# Patient Record
Sex: FEMALE | Race: WHITE | Employment: OTHER | URBAN - METROPOLITAN AREA
[De-identification: names, ages, dates, MRNs, and addresses within clinical notes are randomized per-mention and may not be internally consistent; named-entity substitution may affect disease eponyms.]

---

## 2018-09-24 ENCOUNTER — HOSPITAL ENCOUNTER (INPATIENT)
Facility: CLINIC | Age: 63
LOS: 5 days | Discharge: HOME | DRG: 193 | End: 2018-09-29
Attending: EMERGENCY MEDICINE | Admitting: HOSPITALIST
Payer: COMMERCIAL

## 2018-09-24 ENCOUNTER — APPOINTMENT (OUTPATIENT)
Dept: CT IMAGING | Facility: CLINIC | Age: 63
DRG: 193 | End: 2018-09-24
Attending: EMERGENCY MEDICINE
Payer: COMMERCIAL

## 2018-09-24 ENCOUNTER — APPOINTMENT (OUTPATIENT)
Dept: GENERAL RADIOLOGY | Facility: CLINIC | Age: 63
DRG: 193 | End: 2018-09-24
Attending: EMERGENCY MEDICINE
Payer: COMMERCIAL

## 2018-09-24 DIAGNOSIS — F43.21 ADJUSTMENT DISORDER WITH DEPRESSED MOOD: Primary | ICD-10-CM

## 2018-09-24 DIAGNOSIS — R79.89 ELEVATED D-DIMER: ICD-10-CM

## 2018-09-24 DIAGNOSIS — J18.9 PNEUMONIA OF BOTH LUNGS DUE TO INFECTIOUS ORGANISM, UNSPECIFIED PART OF LUNG: ICD-10-CM

## 2018-09-24 DIAGNOSIS — J96.01 ACUTE RESPIRATORY FAILURE WITH HYPOXIA (H): ICD-10-CM

## 2018-09-24 LAB
ALBUMIN SERPL-MCNC: 3.4 G/DL (ref 3.4–5)
ALP SERPL-CCNC: 101 U/L (ref 40–150)
ALT SERPL W P-5'-P-CCNC: 29 U/L (ref 0–50)
ANION GAP SERPL CALCULATED.3IONS-SCNC: 11 MMOL/L (ref 3–14)
AST SERPL W P-5'-P-CCNC: 18 U/L (ref 0–45)
BILIRUB SERPL-MCNC: 1.6 MG/DL (ref 0.2–1.3)
BUN SERPL-MCNC: 10 MG/DL (ref 7–30)
CALCIUM SERPL-MCNC: 8.7 MG/DL (ref 8.5–10.1)
CHLORIDE SERPL-SCNC: 103 MMOL/L (ref 94–109)
CO2 SERPL-SCNC: 24 MMOL/L (ref 20–32)
CREAT SERPL-MCNC: 0.65 MG/DL (ref 0.52–1.04)
D DIMER PPP FEU-MCNC: 2.2 UG/ML FEU (ref 0–0.5)
ERYTHROCYTE [DISTWIDTH] IN BLOOD BY AUTOMATED COUNT: 14.2 % (ref 10–15)
GFR SERPL CREATININE-BSD FRML MDRD: >90 ML/MIN/1.7M2
GLUCOSE SERPL-MCNC: 188 MG/DL (ref 70–99)
HCT VFR BLD AUTO: 35 % (ref 35–47)
HGB BLD-MCNC: 11.8 G/DL (ref 11.7–15.7)
INTERPRETATION ECG - MUSE: NORMAL
L PNEUMO1 AG UR QL IA: NORMAL
LACTATE BLD-SCNC: 1.9 MMOL/L (ref 0.7–2)
MCH RBC QN AUTO: 31.1 PG (ref 26.5–33)
MCHC RBC AUTO-ENTMCNC: 33.7 G/DL (ref 31.5–36.5)
MCV RBC AUTO: 92 FL (ref 78–100)
NT-PROBNP SERPL-MCNC: 314 PG/ML (ref 0–900)
PLATELET # BLD AUTO: 232 10E9/L (ref 150–450)
POTASSIUM SERPL-SCNC: 3 MMOL/L (ref 3.4–5.3)
PROCALCITONIN SERPL-MCNC: 0.33 NG/ML
PROT SERPL-MCNC: 7.9 G/DL (ref 6.8–8.8)
RBC # BLD AUTO: 3.8 10E12/L (ref 3.8–5.2)
SODIUM SERPL-SCNC: 138 MMOL/L (ref 133–144)
SPECIMEN SOURCE: NORMAL
TROPONIN I SERPL-MCNC: <0.015 UG/L (ref 0–0.04)
WBC # BLD AUTO: 10.3 10E9/L (ref 4–11)

## 2018-09-24 PROCEDURE — 71260 CT THORAX DX C+: CPT

## 2018-09-24 PROCEDURE — 99223 1ST HOSP IP/OBS HIGH 75: CPT | Mod: AI | Performed by: HOSPITALIST

## 2018-09-24 PROCEDURE — 80053 COMPREHEN METABOLIC PANEL: CPT | Performed by: EMERGENCY MEDICINE

## 2018-09-24 PROCEDURE — 84484 ASSAY OF TROPONIN QUANT: CPT | Performed by: EMERGENCY MEDICINE

## 2018-09-24 PROCEDURE — 84439 ASSAY OF FREE THYROXINE: CPT | Performed by: EMERGENCY MEDICINE

## 2018-09-24 PROCEDURE — 36415 COLL VENOUS BLD VENIPUNCTURE: CPT

## 2018-09-24 PROCEDURE — 84443 ASSAY THYROID STIM HORMONE: CPT | Performed by: EMERGENCY MEDICINE

## 2018-09-24 PROCEDURE — 87899 AGENT NOS ASSAY W/OPTIC: CPT | Performed by: EMERGENCY MEDICINE

## 2018-09-24 PROCEDURE — 27210339 ZZH CIRCUIT HUMIDITY W/CPAP BIP

## 2018-09-24 PROCEDURE — 96375 TX/PRO/DX INJ NEW DRUG ADDON: CPT

## 2018-09-24 PROCEDURE — 99285 EMERGENCY DEPT VISIT HI MDM: CPT | Mod: 25

## 2018-09-24 PROCEDURE — 27210338 ZZH CIRCUIT HUMID FACE/TRACH MSK

## 2018-09-24 PROCEDURE — 25000128 H RX IP 250 OP 636: Performed by: HOSPITALIST

## 2018-09-24 PROCEDURE — 25000128 H RX IP 250 OP 636: Performed by: EMERGENCY MEDICINE

## 2018-09-24 PROCEDURE — 85379 FIBRIN DEGRADATION QUANT: CPT | Performed by: EMERGENCY MEDICINE

## 2018-09-24 PROCEDURE — 96361 HYDRATE IV INFUSION ADD-ON: CPT

## 2018-09-24 PROCEDURE — 83036 HEMOGLOBIN GLYCOSYLATED A1C: CPT | Performed by: EMERGENCY MEDICINE

## 2018-09-24 PROCEDURE — 87040 BLOOD CULTURE FOR BACTERIA: CPT | Performed by: EMERGENCY MEDICINE

## 2018-09-24 PROCEDURE — 96365 THER/PROPH/DIAG IV INF INIT: CPT | Mod: 59

## 2018-09-24 PROCEDURE — 94660 CPAP INITIATION&MGMT: CPT

## 2018-09-24 PROCEDURE — 40000275 ZZH STATISTIC RCP TIME EA 10 MIN

## 2018-09-24 PROCEDURE — 25000125 ZZHC RX 250: Performed by: EMERGENCY MEDICINE

## 2018-09-24 PROCEDURE — 71046 X-RAY EXAM CHEST 2 VIEWS: CPT

## 2018-09-24 PROCEDURE — 84145 PROCALCITONIN (PCT): CPT | Performed by: EMERGENCY MEDICINE

## 2018-09-24 PROCEDURE — 93005 ELECTROCARDIOGRAM TRACING: CPT

## 2018-09-24 PROCEDURE — 25000128 H RX IP 250 OP 636

## 2018-09-24 PROCEDURE — 83880 ASSAY OF NATRIURETIC PEPTIDE: CPT | Performed by: EMERGENCY MEDICINE

## 2018-09-24 PROCEDURE — 83605 ASSAY OF LACTIC ACID: CPT | Performed by: EMERGENCY MEDICINE

## 2018-09-24 PROCEDURE — 85027 COMPLETE CBC AUTOMATED: CPT | Performed by: EMERGENCY MEDICINE

## 2018-09-24 PROCEDURE — 96367 TX/PROPH/DG ADDL SEQ IV INF: CPT | Mod: 59

## 2018-09-24 PROCEDURE — 12000007 ZZH R&B INTERMEDIATE

## 2018-09-24 PROCEDURE — 94640 AIRWAY INHALATION TREATMENT: CPT

## 2018-09-24 PROCEDURE — 00000146 ZZHCL STATISTIC GLUCOSE BY METER IP

## 2018-09-24 PROCEDURE — 25000132 ZZH RX MED GY IP 250 OP 250 PS 637: Performed by: EMERGENCY MEDICINE

## 2018-09-24 RX ORDER — POTASSIUM CHLORIDE 1.5 G/1.58G
40 POWDER, FOR SOLUTION ORAL ONCE
Status: COMPLETED | OUTPATIENT
Start: 2018-09-24 | End: 2018-09-24

## 2018-09-24 RX ORDER — AMOXICILLIN 250 MG
2 CAPSULE ORAL 2 TIMES DAILY PRN
Status: DISCONTINUED | OUTPATIENT
Start: 2018-09-24 | End: 2018-09-29 | Stop reason: HOSPADM

## 2018-09-24 RX ORDER — POTASSIUM CHLORIDE 1500 MG/1
20-40 TABLET, EXTENDED RELEASE ORAL
Status: DISCONTINUED | OUTPATIENT
Start: 2018-09-24 | End: 2018-09-29 | Stop reason: HOSPADM

## 2018-09-24 RX ORDER — GUAIFENESIN/DEXTROMETHORPHAN 100-10MG/5
5 SYRUP ORAL EVERY 4 HOURS PRN
Status: DISCONTINUED | OUTPATIENT
Start: 2018-09-24 | End: 2018-09-29 | Stop reason: HOSPADM

## 2018-09-24 RX ORDER — ALBUTEROL SULFATE 0.83 MG/ML
5 SOLUTION RESPIRATORY (INHALATION) ONCE
Status: COMPLETED | OUTPATIENT
Start: 2018-09-24 | End: 2018-09-24

## 2018-09-24 RX ORDER — DEXTROSE MONOHYDRATE 25 G/50ML
25-50 INJECTION, SOLUTION INTRAVENOUS
Status: DISCONTINUED | OUTPATIENT
Start: 2018-09-24 | End: 2018-09-29 | Stop reason: HOSPADM

## 2018-09-24 RX ORDER — POTASSIUM CHLORIDE 29.8 MG/ML
20 INJECTION INTRAVENOUS
Status: DISCONTINUED | OUTPATIENT
Start: 2018-09-24 | End: 2018-09-29 | Stop reason: HOSPADM

## 2018-09-24 RX ORDER — LIDOCAINE 40 MG/G
CREAM TOPICAL
Status: DISCONTINUED | OUTPATIENT
Start: 2018-09-24 | End: 2018-09-24

## 2018-09-24 RX ORDER — LORAZEPAM 2 MG/ML
.5-1 INJECTION INTRAMUSCULAR EVERY 4 HOURS PRN
Status: DISCONTINUED | OUTPATIENT
Start: 2018-09-24 | End: 2018-09-29 | Stop reason: HOSPADM

## 2018-09-24 RX ORDER — AMOXICILLIN 250 MG
1 CAPSULE ORAL 2 TIMES DAILY PRN
Status: DISCONTINUED | OUTPATIENT
Start: 2018-09-24 | End: 2018-09-29 | Stop reason: HOSPADM

## 2018-09-24 RX ORDER — ONDANSETRON 2 MG/ML
4 INJECTION INTRAMUSCULAR; INTRAVENOUS EVERY 6 HOURS PRN
Status: DISCONTINUED | OUTPATIENT
Start: 2018-09-24 | End: 2018-09-29 | Stop reason: HOSPADM

## 2018-09-24 RX ORDER — IPRATROPIUM BROMIDE AND ALBUTEROL SULFATE 2.5; .5 MG/3ML; MG/3ML
3 SOLUTION RESPIRATORY (INHALATION) EVERY 4 HOURS PRN
Status: DISCONTINUED | OUTPATIENT
Start: 2018-09-24 | End: 2018-09-24

## 2018-09-24 RX ORDER — POTASSIUM CHLORIDE 1.5 G/1.58G
20-40 POWDER, FOR SOLUTION ORAL
Status: DISCONTINUED | OUTPATIENT
Start: 2018-09-24 | End: 2018-09-29 | Stop reason: HOSPADM

## 2018-09-24 RX ORDER — METHYLPREDNISOLONE SODIUM SUCCINATE 125 MG/2ML
125 INJECTION, POWDER, LYOPHILIZED, FOR SOLUTION INTRAMUSCULAR; INTRAVENOUS ONCE
Status: COMPLETED | OUTPATIENT
Start: 2018-09-24 | End: 2018-09-24

## 2018-09-24 RX ORDER — ACETAMINOPHEN 325 MG/1
650 TABLET ORAL EVERY 4 HOURS PRN
Status: DISCONTINUED | OUTPATIENT
Start: 2018-09-24 | End: 2018-09-29 | Stop reason: HOSPADM

## 2018-09-24 RX ORDER — NALOXONE HYDROCHLORIDE 0.4 MG/ML
.1-.4 INJECTION, SOLUTION INTRAMUSCULAR; INTRAVENOUS; SUBCUTANEOUS
Status: DISCONTINUED | OUTPATIENT
Start: 2018-09-24 | End: 2018-09-29 | Stop reason: HOSPADM

## 2018-09-24 RX ORDER — CEFTRIAXONE 2 G/1
2 INJECTION, POWDER, FOR SOLUTION INTRAMUSCULAR; INTRAVENOUS ONCE
Status: COMPLETED | OUTPATIENT
Start: 2018-09-24 | End: 2018-09-24

## 2018-09-24 RX ORDER — DIPHENHYDRAMINE HYDROCHLORIDE 50 MG/ML
50 INJECTION INTRAMUSCULAR; INTRAVENOUS ONCE
Status: COMPLETED | OUTPATIENT
Start: 2018-09-24 | End: 2018-09-24

## 2018-09-24 RX ORDER — POTASSIUM CL/LIDO/0.9 % NACL 10MEQ/0.1L
10 INTRAVENOUS SOLUTION, PIGGYBACK (ML) INTRAVENOUS
Status: DISCONTINUED | OUTPATIENT
Start: 2018-09-24 | End: 2018-09-29 | Stop reason: HOSPADM

## 2018-09-24 RX ORDER — NICOTINE POLACRILEX 4 MG
15-30 LOZENGE BUCCAL
Status: DISCONTINUED | OUTPATIENT
Start: 2018-09-24 | End: 2018-09-29 | Stop reason: HOSPADM

## 2018-09-24 RX ORDER — POTASSIUM CHLORIDE 7.45 MG/ML
10 INJECTION INTRAVENOUS
Status: DISCONTINUED | OUTPATIENT
Start: 2018-09-24 | End: 2018-09-29 | Stop reason: HOSPADM

## 2018-09-24 RX ORDER — ACETAMINOPHEN 650 MG/1
650 SUPPOSITORY RECTAL EVERY 4 HOURS PRN
Status: DISCONTINUED | OUTPATIENT
Start: 2018-09-24 | End: 2018-09-29 | Stop reason: HOSPADM

## 2018-09-24 RX ORDER — LEVALBUTEROL 1.25 MG/.5ML
1.25 SOLUTION, CONCENTRATE RESPIRATORY (INHALATION) ONCE
Status: COMPLETED | OUTPATIENT
Start: 2018-09-24 | End: 2018-09-24

## 2018-09-24 RX ORDER — BISACODYL 10 MG
10 SUPPOSITORY, RECTAL RECTAL DAILY PRN
Status: DISCONTINUED | OUTPATIENT
Start: 2018-09-24 | End: 2018-09-29 | Stop reason: HOSPADM

## 2018-09-24 RX ORDER — IOPAMIDOL 755 MG/ML
77 INJECTION, SOLUTION INTRAVASCULAR ONCE
Status: COMPLETED | OUTPATIENT
Start: 2018-09-24 | End: 2018-09-24

## 2018-09-24 RX ORDER — SODIUM CHLORIDE 9 MG/ML
INJECTION, SOLUTION INTRAVENOUS CONTINUOUS
Status: DISCONTINUED | OUTPATIENT
Start: 2018-09-24 | End: 2018-09-25

## 2018-09-24 RX ORDER — DIPHENHYDRAMINE HYDROCHLORIDE 50 MG/ML
INJECTION INTRAMUSCULAR; INTRAVENOUS
Status: COMPLETED
Start: 2018-09-24 | End: 2018-09-24

## 2018-09-24 RX ORDER — MAGNESIUM SULFATE HEPTAHYDRATE 40 MG/ML
4 INJECTION, SOLUTION INTRAVENOUS EVERY 4 HOURS PRN
Status: DISCONTINUED | OUTPATIENT
Start: 2018-09-24 | End: 2018-09-29 | Stop reason: HOSPADM

## 2018-09-24 RX ORDER — ONDANSETRON 4 MG/1
4 TABLET, ORALLY DISINTEGRATING ORAL EVERY 6 HOURS PRN
Status: DISCONTINUED | OUTPATIENT
Start: 2018-09-24 | End: 2018-09-29 | Stop reason: HOSPADM

## 2018-09-24 RX ORDER — LEVOFLOXACIN 5 MG/ML
500 INJECTION, SOLUTION INTRAVENOUS EVERY 24 HOURS
Status: DISCONTINUED | OUTPATIENT
Start: 2018-09-24 | End: 2018-09-25

## 2018-09-24 RX ORDER — IPRATROPIUM BROMIDE AND ALBUTEROL SULFATE 2.5; .5 MG/3ML; MG/3ML
3 SOLUTION RESPIRATORY (INHALATION) EVERY 4 HOURS PRN
Status: DISCONTINUED | OUTPATIENT
Start: 2018-09-24 | End: 2018-09-27

## 2018-09-24 RX ORDER — POLYETHYLENE GLYCOL 3350 17 G/17G
17 POWDER, FOR SOLUTION ORAL DAILY PRN
Status: DISCONTINUED | OUTPATIENT
Start: 2018-09-24 | End: 2018-09-29 | Stop reason: HOSPADM

## 2018-09-24 RX ORDER — LIDOCAINE 40 MG/G
CREAM TOPICAL
Status: DISCONTINUED | OUTPATIENT
Start: 2018-09-24 | End: 2018-09-29 | Stop reason: HOSPADM

## 2018-09-24 RX ADMIN — SODIUM CHLORIDE 2000 ML: 9 INJECTION, SOLUTION INTRAVENOUS at 19:06

## 2018-09-24 RX ADMIN — SODIUM CHLORIDE: 9 INJECTION, SOLUTION INTRAVENOUS at 23:12

## 2018-09-24 RX ADMIN — IOPAMIDOL 77 ML: 755 INJECTION, SOLUTION INTRAVENOUS at 20:59

## 2018-09-24 RX ADMIN — LEVALBUTEROL HYDROCHLORIDE 1.25 MG: 1.25 SOLUTION, CONCENTRATE RESPIRATORY (INHALATION) at 21:42

## 2018-09-24 RX ADMIN — DIPHENHYDRAMINE HYDROCHLORIDE 50 MG: 50 INJECTION, SOLUTION INTRAMUSCULAR; INTRAVENOUS at 21:22

## 2018-09-24 RX ADMIN — CEFTRIAXONE SODIUM 2 G: 2 INJECTION, POWDER, FOR SOLUTION INTRAMUSCULAR; INTRAVENOUS at 19:50

## 2018-09-24 RX ADMIN — ALBUTEROL SULFATE 5 MG: 2.5 SOLUTION RESPIRATORY (INHALATION) at 19:46

## 2018-09-24 RX ADMIN — AZITHROMYCIN MONOHYDRATE 500 MG: 500 INJECTION, POWDER, LYOPHILIZED, FOR SOLUTION INTRAVENOUS at 21:05

## 2018-09-24 RX ADMIN — SODIUM CHLORIDE, PRESERVATIVE FREE 100 ML: 5 INJECTION INTRAVENOUS at 20:59

## 2018-09-24 RX ADMIN — POTASSIUM CHLORIDE 40 MEQ: 1.5 POWDER, FOR SOLUTION ORAL at 20:10

## 2018-09-24 RX ADMIN — METHYLPREDNISOLONE SODIUM SUCCINATE 125 MG: 125 INJECTION, POWDER, FOR SOLUTION INTRAMUSCULAR; INTRAVENOUS at 21:26

## 2018-09-24 RX ADMIN — DIPHENHYDRAMINE HYDROCHLORIDE 50 MG: 50 INJECTION INTRAMUSCULAR; INTRAVENOUS at 21:22

## 2018-09-24 ASSESSMENT — ENCOUNTER SYMPTOMS
CHILLS: 1
SINUS PRESSURE: 0
COUGH: 1
FEVER: 1
HEADACHES: 0
SINUS PAIN: 0
SHORTNESS OF BREATH: 1

## 2018-09-24 NOTE — ED NOTES
Bed: ED11  Expected date:   Expected time:   Means of arrival:   Comments:  Rodríguez Gaviria SOB 63 femlae

## 2018-09-24 NOTE — IP AVS SNAPSHOT
MRN:[id number]                      After Visit Summary   9/24/2018    Alaina Fernandez    MRN: [id number]           Thank you!     Thank you for choosing [facility] for your care. Our goal is always to provide you with excellent care. Hearing back from our patients is one way we can continue to improve our services. Please take a few minutes to complete the written survey that you may receive in the mail after you visit with us. Thank you!        Patient Information     Date Of Birth          1955        Designated Caregiver       Most Recent Value    Caregiver    Will someone help with your care after discharge? yes    Name of designated caregiver Miguel Fernandez    Phone number of caregiver see facesheet    Caregiver address see facesheet      About your hospital stay     You were admitted on:  September 24, 2018 You last received care in the:  [facility] [address] [facility]    You were discharged on:  September 29, 2018        Reason for your hospital stay       Pneumonia                  Who to Call     For medical emergencies, please call 911.  For non-urgent questions about your medical care, please call your primary care provider or clinic, [phone]          Attending Provider     Provider Specialty    Heidy Castillo MD Emergency Medicine    GoyoValdemar DO Internal Medicine       Primary Care Provider Office Phone # Fax #    Carlita DO [place] [phone]      After Care Instructions     Activity       Your activity upon discharge: activity as tolerated            Diet       Follow this diet upon discharge: Diabetic diet            Discharge Instructions       Call Dr. Galaviz at Pager [phone] if questions, or Cell Phone [phone].                  Follow-up Appointments     Follow-up and recommended labs and tests        You have an appointment on October 22nd with Dr Loera (pulmonologist) at 1:45 PM in [place] (Address: [address]).  Please call clinic at [phone] if you do not hear from them for your CT scan appointment that needs to be scheduled before you October 22nd appointment with Dr Loera.   Please call  [phone] to schedule your sleep consult.      Please check with your health insurance for provider coverage before your follow up appointments.            Follow-up and recommended labs and tests        Follow up with primary care provider, Carlita Beyer, as scheduled on Tues, 10/2/18 at 3:15 PM.                  Pending Results     Date and Time Order Name Status Description    9/24/2018 1903 Blood culture Preliminary     9/24/2018 1903 Blood culture Preliminary     9/24/2018 1845 T4 free In process             Statement of Approval     Ordered          09/29/18 1451  I have reviewed and agree with all the recommendations and orders detailed in this document.  EFFECTIVE NOW     Approved and electronically signed by:  Tyler Galaviz MD             Admission Information     Date & Time Provider Department Dept. Phone    9/24/2018 Valdemar Nance DO [facility] [address] [facility] [phone]      Your Vitals Were     Blood Pressure Pulse Temperature Respirations Height Weight    176/78 (BP Location: Left arm) 98 98.8  F (37.1  C) (Oral) 18 1.6 m (5' 3\") 99.3 kg (219 lb)    Pulse Oximetry BMI (Body Mass Index)                99% 38.79 kg/m2          [employer] Information     [employer] lets you send messages to your doctor, view your test results, renew your prescriptions, schedule appointments and more. To sign up, go to [email or web address] . Click on \"Log in\" on the left side of the screen, which will take you to the Welcome page. Then click on \"Sign up Now\" on the right side of the page.     You will be asked to enter the access code listed below, as well as some personal information. Please follow the directions to create your username and password.   "   Your access code is: [email or web address]  Expires: 2018 10:14 PM     Your access code will  in 90 days. If you need help or a new code, please call your [facility] clinic or [phone].        Care EveryWhere ID     This is your Care EveryWhere ID. This could be used by other organizations to access your [facility] medical records  [id number]        Equal Access to Services     STEVE CRAMER : Hadii aad ku hadasho Soomaali, waaxda luqadaha, qaybta kaalmada adeegyada, waxay carolain jessiacn adeeg jade latawandaparrish . So [facility] [phone].    ATENCIÓN: Si habla español, tiene a higuera disposición servicios gratuitos de asistencia lingüística. Llame al [phone].    We comply with applicable federal civil rights laws and [place] laws. We do not discriminate on the basis of race, color, national origin, age, disability, sex, sexual orientation, or gender identity.               Review of your medicines      START taking        Dose / Directions    albuterol 108 (90 Base) MCG/ACT inhaler   Commonly known as:  PROAIR HFA   Used for:  Pneumonia of both lungs due to infectious organism, unspecified part of lung        Dose:  2 puff   Inhale 2 puffs into the lungs every 4 hours as needed for shortness of breath / dyspnea or wheezing   Quantity:  1 Inhaler   Refills:  1       guaiFENesin-dextromethorphan 100-10 MG/5ML syrup   Commonly known as:  ROBITUSSIN DM   Used for:  Pneumonia of both lungs due to infectious organism, unspecified part of lung        Dose:  10 mL   Take 10 mLs by mouth every 4 hours as needed for cough   Quantity:  236 mL   Refills:  0       levofloxacin 750 MG tablet   Commonly known as:  LEVAQUIN   Indication:  Community Acquired Pneumonia   Used for:  Pneumonia of both lungs due to infectious organism, unspecified part of lung        Dose:  750 mg   Start taking on:  2018   Take 1 tablet (750 mg) by mouth daily for 7 days   Quantity:  7 tablet   Refills:  0       predniSONE 20 MG tablet   Commonly  known as:  DELTASONE   Used for:  Pneumonia of both lungs due to infectious organism, unspecified part of lung        2 pills daily for 3 days, 1 pill daily for 3 days, then 1/2 pill daily for 3 days.   Quantity:  12 tablet   Refills:  0       Spacer/Aero Chamber Mouthpiece Misc   Used for:  Pneumonia of both lungs due to infectious organism, unspecified part of lung        Dose:  1 Device   1 Device as needed (for use with albuterol inhaler)   Quantity:  1 each   Refills:  0         CONTINUE these medicines which may have CHANGED, or have new prescriptions. If we are uncertain of the size of tablets/capsules you have at home, strength may be listed as something that might have changed.        Dose / Directions    citalopram 40 MG tablet   Commonly known as:  celeXA   This may have changed:  These instructions start on 10/6/2018. If you are unsure what to do until then, ask your doctor or other care provider.   Used for:  Adjustment disorder with depressed mood        Dose:  40 mg   Start taking on:  10/6/2018   Take 1 tablet (40 mg) by mouth daily   Quantity:  30 tablet   Refills:  0         CONTINUE these medicines which have NOT CHANGED        Dose / Directions    atorvastatin 40 MG tablet   Commonly known as:  LIPITOR        Dose:  40 mg   Take 40 mg by mouth daily   Refills:  0       levothyroxine 137 MCG tablet   Commonly known as:  SYNTHROID/LEVOTHROID        Dose:  137 mcg   Take 137 mcg by mouth daily   Refills:  0       metFORMIN 500 MG 24 hr tablet   Commonly known as:  GLUCOPHAGE-XR        Dose:  1000 mg   Take 1,000 mg by mouth daily (with breakfast)   Refills:  0       metoprolol succinate 25 MG 24 hr tablet   Commonly known as:  TOPROL-XL        Dose:  25 mg   Take 25 mg by mouth daily   Refills:  0       omeprazole 20 MG CR capsule   Commonly known as:  priLOSEC        Dose:  20 mg   Take 20 mg by mouth daily   Refills:  0       oxybutynin 10 MG 24 hr tablet   Commonly known as:  DITROPAN-XL         Dose:  10 mg   Take 10 mg by mouth daily   Refills:  0            Where to get your medicines      These medications were sent to [facility] [place], [place] - [address]  [address], [place] [address]     Phone:  [phone]     albuterol 108 (90 Base) MCG/ACT inhaler    guaiFENesin-dextromethorphan 100-10 MG/5ML syrup    levofloxacin 750 MG tablet    predniSONE 20 MG tablet    Spacer/Aero Chamber Mouthpiece Misc         Some of these will need a paper prescription and others can be bought over the counter. Ask your nurse if you have questions.     You don't need a prescription for these medications     citalopram 40 MG tablet                Protect others around you: Learn how to safely use, store and throw away your medicines at www.disposemymeds.org.        ANTIBIOTIC INSTRUCTION     You've Been Prescribed an Antibiotic - Now What?  Your healthcare team thinks that you or your loved one might have an infection. Some infections can be treated with antibiotics, which are powerful, life-saving drugs. Like all medications, antibiotics have side effects and should only be used when necessary. There are some important things you should know about your antibiotic treatment.      Your healthcare team may run tests before you start taking an antibiotic.    Your team may take samples (e.g., from your blood, urine or other areas) to run tests to look for bacteria. These test can be important to determine if you need an antibiotic at all and, if you do, which antibiotic will work best.      Within a few days, your healthcare team might change or even stop your antibiotic.    Your team may start you on an antibiotic while they are working to find out what is making you sick.    Your team might change your antibiotic because test results show that a different antibiotic would be better to treat your infection.    In some cases, once your team has more information, they learn that you do not  need an antibiotic at all. They may find out that you don't have an infection, or that the antibiotic you're taking won't work against your infection. For example, an infection caused by a virus can't be treated with antibiotics. Staying on an antibiotic when you don't need it is more likely to be harmful than helpful.      You may experience side effects from your antibiotic.    Like all medications, antibiotics have side effects. Some of these can be serious.    Let you healthcare team know if you have any known allergies when you are admitted to the hospital.    One significant side effect of nearly all antibiotics is the risk of severe and sometimes deadly diarrhea caused by Clostridium difficile (C. Difficile). This occurs when a person takes antibiotics because some good germs are destroyed. Antibiotic use allows C. diificile to take over, putting patients at high risk for this serious infection.    As a patient or caregiver, it is important to understand your or your loved one's antibiotic treatment. It is especially important for caregivers to speak up when patients can't speak for themselves. Here are some important questions to ask your healthcare team.    What infection is this antibiotic treating and how do you know I have that infection?    What side effects might occur from this antibiotic?    How long will I need to take this antibiotic?    Is it safe to take this antibiotic with other medications or supplements (e.g., vitamins) that I am taking?     Are there any special directions I need to know about taking this antibiotic? For example, should I take it with food?    How will I be monitored to know whether my infection is responding to the antibiotic?    What tests may help to make sure the right antibiotic is prescribed for me?      Information provided by:  www.cdc.gov/getsmart  U.S. Department of Health and Human Services  Centers for disease Control and Prevention  National Center for Emerging  and Zoonotic Infectious Diseases  Division of Healthcare Quality Promotion             Medication List: This is a list of all your medications and when to take them. Check marks below indicate your daily home schedule. Keep this list as a reference.      Medications           Morning Afternoon Evening Bedtime As Needed    albuterol 108 (90 Base) MCG/ACT inhaler   Commonly known as:  PROAIR HFA   Inhale 2 puffs into the lungs every 4 hours as needed for shortness of breath / dyspnea or wheezing                                atorvastatin 40 MG tablet   Commonly known as:  LIPITOR   Take 40 mg by mouth daily   Last time this was given:  40 mg on 9/29/2018  9:37 AM                                citalopram 40 MG tablet   Commonly known as:  celeXA   Take 1 tablet (40 mg) by mouth daily   Start taking on:  10/6/2018   Last time this was given:  40 mg on 9/27/2018  8:23 AM                                guaiFENesin-dextromethorphan 100-10 MG/5ML syrup   Commonly known as:  ROBITUSSIN DM   Take 10 mLs by mouth every 4 hours as needed for cough   Last time this was given:  5 mLs on 9/29/2018  2:56 PM                                levofloxacin 750 MG tablet   Commonly known as:  LEVAQUIN   Take 1 tablet (750 mg) by mouth daily for 7 days   Start taking on:  9/30/2018   Last time this was given:  750 mg on 9/29/2018  9:37 AM                                levothyroxine 137 MCG tablet   Commonly known as:  SYNTHROID/LEVOTHROID   Take 137 mcg by mouth daily   Last time this was given:  137 mcg on 9/29/2018  6:54 AM                                metFORMIN 500 MG 24 hr tablet   Commonly known as:  GLUCOPHAGE-XR   Take 1,000 mg by mouth daily (with breakfast)                                metoprolol succinate 25 MG 24 hr tablet   Commonly known as:  TOPROL-XL   Take 25 mg by mouth daily   Last time this was given:  25 mg on 9/29/2018  9:37 AM                                omeprazole 20 MG CR capsule   Commonly known as:   priLOSEC   Take 20 mg by mouth daily   Last time this was given:  20 mg on 9/29/2018  9:37 AM                                oxybutynin 10 MG 24 hr tablet   Commonly known as:  DITROPAN-XL   Take 10 mg by mouth daily   Last time this was given:  10 mg on 9/29/2018  9:37 AM                                predniSONE 20 MG tablet   Commonly known as:  DELTASONE   2 pills daily for 3 days, 1 pill daily for 3 days, then 1/2 pill daily for 3 days.   Last time this was given:  60 mg on 9/29/2018  9:37 AM                                Spacer/Aero Chamber Mouthpiece Misc   1 Device as needed (for use with albuterol inhaler)

## 2018-09-24 NOTE — IP AVS SNAPSHOT
[facility]    [address] [place] [address]    Phone:  [phone]                                       After Visit Summary   9/24/2018    Alaina Fernandez    MRN: [id number]           After Visit Summary Signature Page     I have received my discharge instructions, and my questions have been answered. I have discussed any challenges I see with this plan with the nurse or doctor.    ..........................................................................................................................................  Patient/Patient Representative Signature      ..........................................................................................................................................  Patient Representative Print Name and Relationship to Patient    ..................................................               ................................................  Date                                   Time    ..........................................................................................................................................  Reviewed by Signature/Title    ...................................................              ..............................................  Date                                               Time          22EPIC Rev 08/18

## 2018-09-24 NOTE — ED PROVIDER NOTES
History     Chief Complaint:  Shortness of Breath    HPI   Alaina Fernandez is a 63 year old female who presents with shortness of breath. The patient states her and her spouse flew to [facility] on 9/6/18 and left for a [employer] cruise lasting 9/9/18-9/21/18. The patient reports her spouse first began coughing about one week ago, and notes she started coughing and had chills every night beginning on 9/21/18. Since then, she reports some chest pain while she coughs as well as some shortness of breath beginning 9/22/18. Today, the patient states they flew back to the [place] and notes she came directly to the hospital via EMS due to the severity of her shortness of breath. She received one Duoneb en route to the ED. She denies any current pain, headache, or sinus pressure.    Allergies:  No known drug allergies    Medications:    Metoprolol  Ditropan   Metformin  Atorvastatin  Thyroid medication  Tagamet    Past Medical History:    Diabetes  High cholesterol  Hypothyroidism  PVCs  Overactive bladder    Past Surgical History:    History reviewed. No pertinent surgical history.    Family History:    History reviewed. No pertinent family history.     Social History:  Smoking status: No  Presents to the ED with her spouse  Marital Status:     Review of Systems   Constitutional: Positive for chills and fever.   HENT: Positive for congestion. Negative for sinus pain and sinus pressure.    Respiratory: Positive for cough and shortness of breath.    Cardiovascular: Positive for chest pain (intermittent with coughing).   Neurological: Negative for headaches.   All other systems reviewed and are negative.    Physical Exam   Patient Vitals for the past 24 hrs:   BP Temp Temp src Heart Rate Resp SpO2 Height   09/24/18 2225 - 100.3  F (37.9  C) Oral - - - -   09/24/18 2217 - - - 115 18 97 % -   09/24/18 2215 - - - 115 - 98 % -   09/24/18 2200 124/69 - - 114 - 98 % -   09/24/18 2145 - - - 115 - 97 % -  "  09/24/18 2142 - - - - - 97 % -   09/24/18 2130 140/79 - - 123 - 99 % -   09/24/18 2115 - - - 133 - (!) 87 % -   09/24/18 2100 (!) 179/93 - - - - - -   09/24/18 2045 - - - 131 - - -   09/24/18 2030 154/67 - - 122 11 94 % -   09/24/18 2015 - - - 124 25 95 % -   09/24/18 2000 152/72 - - 118 23 (!) 86 % -   09/24/18 1958 - - - 116 20 98 % -   09/24/18 1943 - - - 109 - 95 % -   09/24/18 1942 129/68 - - - - - -   09/24/18 1851 - - - - - 92 % -   09/24/18 1850 118/68 100.7  F (38.2  C) Oral 125 20 (!) 88 % 1.6 m (5' 3\")   09/24/18 1844 - - - - - 90 % -   09/24/18 1843 - - - - - (!) 88 % -     Physical Exam  Nursing note and vitals reviewed.  Constitutional:  Appears well-developed and well-nourished.   HENT:   Head:    Atraumatic.   Mouth/Throat:   Oropharynx is clear and moist. No oropharyngeal exudate.   Eyes:    Pupils are equal, round, and reactive to light.   Neck:    Normal range of motion. Neck supple.      No tracheal deviation present. No thyromegaly present.   Cardiovascular:  Tachycardia rate, regular rhythm, no murmur  Pulmonary/Chest: Crackles in left mid-lung field with expiratory wheezes bilaterally. Mild tachypnea.  Abdominal:   Soft. Bowel sounds are normal. Exhibits no distension and      no mass. There is no tenderness.      There is no rebound and no guarding.   Musculoskeletal:  Exhibits no edema.   Lymphadenopathy:  No cervical adenopathy.   Neurological:   Alert and oriented to person, place, and time.   Skin:    Skin is warm and dry. No rash noted. No pallor.     Emergency Department Course   ECG (19:35:53):  Rate 109 bpm. AZ interval 124. QRS duration 74. QT/QTc 298/401. P-R-T axes 68 78 -9. Sinus tachycardia. Nonspecific ST and T wave abnormality. Abnormal ECG. Interpreted at 1944 by Heidy Castillo MD.    Imaging:  Radiographic findings were communicated with the patient who voiced understanding of the findings.    Chest XR, PA, & LAT:  Small infiltrate in the medial segment of the right " middle  lobe consistent with pneumonia. Otherwise normal.  As read by Radiology.    Chest CT, IV contrast only - PE protocol:  1. No pulmonary embolism is seen.  2. Small areas of increased density throughout both lungs consistent  with small infiltrates or atelectasis. No distinct mass is seen.  As read by Radiology.    Laboratory:  CBC: WNL (WBC 10.3, HGB 11.8, )  BMP: Potassium 3.0 (L), Glucose 188 (H), Bilirubin 1.6 (H), o/w WNL (Creatinine 0.65)  Lactic acid: 1.9  Troponin: <0.015  D-dimer: 2.2 (H)  BNP: 314  Procalcitonin: 0.33  Blood cultures: In process  Legionella pneumonia antigen urine: In process    Interventions:  1906: NS 2L IV Bolus  1946: 5 mg Albuterol nebulization  1950: 2 g Ceftriaxone IV  2010: 40 mEq Potassium chloride PO  2105: 500 mg Azithromycin IV  2122: 50 mg Benadryl IV  2126: 125 mg Solu-medrol IV  2142: 1.25 mg Xopenex nebulization administered by RT    Emergency Department Course:  Past medical records, nursing notes, and vitals reviewed.  1848: I performed an exam of the patient and obtained history, as documented above.  ECG performed, results above.  IV inserted and blood drawn.  The patient was sent for a chest x-ray while in the emergency department, findings above.    2028: I rechecked the patient. Explained findings to the patient and her spouse. She will be sent for a chest CT based on her elevated D-dimer, findings above.    2115: I rechecked the patient. Her oxygen saturations were down to 86% on oxygen via nasal cannula and she reports feeling extremely anxious at this time.    2129: The patient was placed on BiPAP by RT.    2134: I spoke with the radiologist regarding her CT. She likely has a bilateral pneumonia, but no evidence for PE or pleural effusion.    2139: I spoke to Dr. Nance of the hospitalist service who accepts the patient for admission.     2144: I rechecked the patient. Explained findings to the patient and her spouse.    Findings and plan explained  to the patient who consents to admission. Discussed the patient with Dr. Nance, who will admit the patient to an [facility] bed for further monitoring, evaluation, and treatment.     Impression & Plan    Medical Decision Making:  Alaina Fernandez is a 63 year old female who presents to the ED for evaluation of shortness of breath. I found the patient to have acute hypoxic respiratory failure due to bilateral pneumonia with bronchospasm. I was initially concerned about the possibility of pulmonary embolism since she was very tachycardic and in significant respiratory distress, but her CT scan does not show any evidence of pulmonary embolism. There was also no sign of acute myocardial infarction, and her troponin was normal. She was treated for community-acquired pneumonia with Ceftriaxone and Azithromycin as well as IV fluid hydration. She was admitted to [facility] under the care of the hospitalist Dr. Valdemar Nance. After returning from CT, she was having worsening respiratory distress, so I considered the possibility of allergic reaction to CT contrast dye. She was subsequently treated with Benadryl, Solu-medrol, and BiPAP with improvement in her condition.    Critical Care: was 45 minutes for this patient excluding procedures.    Diagnosis:    ICD-10-CM    1. Acute respiratory failure with hypoxia (H) J96.01    2. Pneumonia of both lungs due to infectious organism, unspecified part of lung J18.9    3. Elevated d-dimer R79.89        Disposition: Admitted to [facility]    Lacey Nicole  9/24/2018    EMERGENCY DEPARTMENT    I, Lcaey Nicole, am serving as a scribe at 6:48 PM on 9/24/2018 to document services personally performed by Heidy Castillo MD based on my observations and the provider's statements to me.      Heidy Castillo MD  09/25/18 0114       Heidy Castillo MD  09/25/18 0117

## 2018-09-25 ENCOUNTER — APPOINTMENT (OUTPATIENT)
Dept: ULTRASOUND IMAGING | Facility: CLINIC | Age: 63
DRG: 193 | End: 2018-09-25
Attending: HOSPITALIST
Payer: COMMERCIAL

## 2018-09-25 LAB
ALBUMIN SERPL-MCNC: 2.8 G/DL (ref 3.4–5)
ALP SERPL-CCNC: 84 U/L (ref 40–150)
ALT SERPL W P-5'-P-CCNC: 26 U/L (ref 0–50)
ANION GAP SERPL CALCULATED.3IONS-SCNC: 5 MMOL/L (ref 3–14)
AST SERPL W P-5'-P-CCNC: 18 U/L (ref 0–45)
BILIRUB SERPL-MCNC: 0.5 MG/DL (ref 0.2–1.3)
BUN SERPL-MCNC: 9 MG/DL (ref 7–30)
CALCIUM SERPL-MCNC: 8.2 MG/DL (ref 8.5–10.1)
CHLORIDE SERPL-SCNC: 112 MMOL/L (ref 94–109)
CO2 SERPL-SCNC: 26 MMOL/L (ref 20–32)
CREAT SERPL-MCNC: 0.62 MG/DL (ref 0.52–1.04)
ERYTHROCYTE [DISTWIDTH] IN BLOOD BY AUTOMATED COUNT: 14.3 % (ref 10–15)
GFR SERPL CREATININE-BSD FRML MDRD: >90 ML/MIN/1.7M2
GLUCOSE BLDC GLUCOMTR-MCNC: 139 MG/DL (ref 70–99)
GLUCOSE BLDC GLUCOMTR-MCNC: 161 MG/DL (ref 70–99)
GLUCOSE BLDC GLUCOMTR-MCNC: 176 MG/DL (ref 70–99)
GLUCOSE BLDC GLUCOMTR-MCNC: 186 MG/DL (ref 70–99)
GLUCOSE BLDC GLUCOMTR-MCNC: 209 MG/DL (ref 70–99)
GLUCOSE SERPL-MCNC: 208 MG/DL (ref 70–99)
GRAM STN SPEC: NORMAL
HBA1C MFR BLD: 5.9 % (ref 0–5.6)
HCT VFR BLD AUTO: 31.6 % (ref 35–47)
HGB BLD-MCNC: 10.6 G/DL (ref 11.7–15.7)
MCH RBC QN AUTO: 31.1 PG (ref 26.5–33)
MCHC RBC AUTO-ENTMCNC: 33.5 G/DL (ref 31.5–36.5)
MCV RBC AUTO: 93 FL (ref 78–100)
PLATELET # BLD AUTO: 78 10E9/L (ref 150–450)
POTASSIUM SERPL-SCNC: 3.9 MMOL/L (ref 3.4–5.3)
PROT SERPL-MCNC: 7 G/DL (ref 6.8–8.8)
RBC # BLD AUTO: 3.41 10E12/L (ref 3.8–5.2)
SODIUM SERPL-SCNC: 143 MMOL/L (ref 133–144)
SPECIMEN SOURCE: NORMAL
T4 FREE SERPL-MCNC: 1.32 NG/DL (ref 0.76–1.46)
T4 FREE SERPL-MCNC: 1.39 NG/DL (ref 0.76–1.46)
TSH SERPL DL<=0.005 MIU/L-ACNC: 0.22 MU/L (ref 0.4–4)
WBC # BLD AUTO: 4.5 10E9/L (ref 4–11)

## 2018-09-25 PROCEDURE — 25000128 H RX IP 250 OP 636: Performed by: HOSPITALIST

## 2018-09-25 PROCEDURE — 25000131 ZZH RX MED GY IP 250 OP 636 PS 637: Performed by: HOSPITALIST

## 2018-09-25 PROCEDURE — 25000132 ZZH RX MED GY IP 250 OP 250 PS 637: Performed by: INTERNAL MEDICINE

## 2018-09-25 PROCEDURE — 40000275 ZZH STATISTIC RCP TIME EA 10 MIN

## 2018-09-25 PROCEDURE — 80053 COMPREHEN METABOLIC PANEL: CPT | Performed by: HOSPITALIST

## 2018-09-25 PROCEDURE — 36415 COLL VENOUS BLD VENIPUNCTURE: CPT | Performed by: HOSPITALIST

## 2018-09-25 PROCEDURE — 94640 AIRWAY INHALATION TREATMENT: CPT

## 2018-09-25 PROCEDURE — 93970 EXTREMITY STUDY: CPT

## 2018-09-25 PROCEDURE — 00000146 ZZHCL STATISTIC GLUCOSE BY METER IP

## 2018-09-25 PROCEDURE — 87070 CULTURE OTHR SPECIMN AEROBIC: CPT | Performed by: HOSPITALIST

## 2018-09-25 PROCEDURE — 90682 RIV4 VACC RECOMBINANT DNA IM: CPT | Performed by: HOSPITALIST

## 2018-09-25 PROCEDURE — 94660 CPAP INITIATION&MGMT: CPT

## 2018-09-25 PROCEDURE — 25000132 ZZH RX MED GY IP 250 OP 250 PS 637

## 2018-09-25 PROCEDURE — 87205 SMEAR GRAM STAIN: CPT | Performed by: HOSPITALIST

## 2018-09-25 PROCEDURE — 12000007 ZZH R&B INTERMEDIATE

## 2018-09-25 PROCEDURE — 25000125 ZZHC RX 250: Performed by: HOSPITALIST

## 2018-09-25 PROCEDURE — 94640 AIRWAY INHALATION TREATMENT: CPT | Mod: 76

## 2018-09-25 PROCEDURE — 85027 COMPLETE CBC AUTOMATED: CPT | Performed by: HOSPITALIST

## 2018-09-25 PROCEDURE — 99233 SBSQ HOSP IP/OBS HIGH 50: CPT | Performed by: INTERNAL MEDICINE

## 2018-09-25 PROCEDURE — 25000128 H RX IP 250 OP 636: Performed by: INTERNAL MEDICINE

## 2018-09-25 PROCEDURE — 25000132 ZZH RX MED GY IP 250 OP 250 PS 637: Performed by: HOSPITALIST

## 2018-09-25 PROCEDURE — 40000886 ZZH STATISTIC STEP DOWN HRS DAY

## 2018-09-25 PROCEDURE — 84439 ASSAY OF FREE THYROXINE: CPT | Performed by: HOSPITALIST

## 2018-09-25 PROCEDURE — 84439 ASSAY OF FREE THYROXINE: CPT | Performed by: INTERNAL MEDICINE

## 2018-09-25 RX ORDER — METFORMIN HCL 500 MG
1000 TABLET, EXTENDED RELEASE 24 HR ORAL
COMMUNITY

## 2018-09-25 RX ORDER — OXYBUTYNIN CHLORIDE 10 MG/1
10 TABLET, EXTENDED RELEASE ORAL DAILY
Status: DISCONTINUED | OUTPATIENT
Start: 2018-09-25 | End: 2018-09-29 | Stop reason: HOSPADM

## 2018-09-25 RX ORDER — CITALOPRAM HYDROBROMIDE 40 MG/1
40 TABLET ORAL DAILY
Status: ON HOLD | COMMUNITY
End: 2018-09-29

## 2018-09-25 RX ORDER — CITALOPRAM HYDROBROMIDE 20 MG/1
40 TABLET ORAL DAILY
Status: DISCONTINUED | OUTPATIENT
Start: 2018-09-25 | End: 2018-09-28

## 2018-09-25 RX ORDER — AZITHROMYCIN 250 MG/1
250 TABLET, FILM COATED ORAL DAILY
Status: DISCONTINUED | OUTPATIENT
Start: 2018-09-26 | End: 2018-09-28

## 2018-09-25 RX ORDER — LEVOTHYROXINE SODIUM 100 UG/1
100 TABLET ORAL ONCE
Status: COMPLETED | OUTPATIENT
Start: 2018-09-25 | End: 2018-09-25

## 2018-09-25 RX ORDER — METOPROLOL SUCCINATE 25 MG/1
25 TABLET, EXTENDED RELEASE ORAL DAILY
Status: DISCONTINUED | OUTPATIENT
Start: 2018-09-25 | End: 2018-09-29 | Stop reason: HOSPADM

## 2018-09-25 RX ORDER — OMEPRAZOLE 20 MG/1
20 TABLET, DELAYED RELEASE ORAL DAILY
Status: ON HOLD | COMMUNITY
End: 2018-09-25

## 2018-09-25 RX ORDER — OXYBUTYNIN CHLORIDE 10 MG/1
10 TABLET, EXTENDED RELEASE ORAL DAILY
COMMUNITY

## 2018-09-25 RX ORDER — ATORVASTATIN CALCIUM 40 MG/1
40 TABLET, FILM COATED ORAL DAILY
COMMUNITY

## 2018-09-25 RX ORDER — METOPROLOL SUCCINATE 25 MG/1
25 TABLET, EXTENDED RELEASE ORAL DAILY
COMMUNITY

## 2018-09-25 RX ORDER — LEVOTHYROXINE SODIUM 137 UG/1
137 TABLET ORAL DAILY
COMMUNITY

## 2018-09-25 RX ORDER — CEFTRIAXONE 2 G/1
2 INJECTION, POWDER, FOR SOLUTION INTRAMUSCULAR; INTRAVENOUS EVERY 24 HOURS
Status: DISCONTINUED | OUTPATIENT
Start: 2018-09-25 | End: 2018-09-28

## 2018-09-25 RX ORDER — LEVOTHYROXINE SODIUM 137 UG/1
137 TABLET ORAL DAILY
Status: DISCONTINUED | OUTPATIENT
Start: 2018-09-25 | End: 2018-09-25

## 2018-09-25 RX ORDER — LEVOTHYROXINE SODIUM 137 UG/1
137 TABLET ORAL
Status: DISCONTINUED | OUTPATIENT
Start: 2018-09-26 | End: 2018-09-29 | Stop reason: HOSPADM

## 2018-09-25 RX ORDER — ATORVASTATIN CALCIUM 40 MG/1
40 TABLET, FILM COATED ORAL DAILY
Status: DISCONTINUED | OUTPATIENT
Start: 2018-09-25 | End: 2018-09-29 | Stop reason: HOSPADM

## 2018-09-25 RX ADMIN — INSULIN ASPART 2 UNITS: 100 INJECTION, SOLUTION INTRAVENOUS; SUBCUTANEOUS at 12:07

## 2018-09-25 RX ADMIN — INSULIN ASPART 1 UNITS: 100 INJECTION, SOLUTION INTRAVENOUS; SUBCUTANEOUS at 09:42

## 2018-09-25 RX ADMIN — LEVOFLOXACIN 500 MG: 5 INJECTION, SOLUTION INTRAVENOUS at 00:13

## 2018-09-25 RX ADMIN — GUAIFENESIN AND DEXTROMETHORPHAN 5 ML: 100; 10 SYRUP ORAL at 11:34

## 2018-09-25 RX ADMIN — METOPROLOL SUCCINATE 25 MG: 25 TABLET, EXTENDED RELEASE ORAL at 14:18

## 2018-09-25 RX ADMIN — CITALOPRAM HYDROBROMIDE 40 MG: 20 TABLET ORAL at 14:18

## 2018-09-25 RX ADMIN — INFLUENZA A VIRUS A/MICHIGAN/45/2015 (H1N1) RECOMBINANT HEMAGGLUTININ ANTIGEN, INFLUENZA A VIRUS A/SINGAPORE/INFIMH-16-0019/2016 (H3N2) RECOMBINANT HEMAGGLUTININ ANTIGEN, INFLUENZA B VIRUS B/MARYLAND/15/2016 RECOMBINANT HEMAGGLUTININ ANTIGEN, AND INFLUENZA B VIRUS B/PHUKET/3073/2013 RECOMBINANT HEMAGGLUTININ ANTIGEN 0.5 ML: 45; 45; 45; 45 INJECTION INTRAMUSCULAR at 12:07

## 2018-09-25 RX ADMIN — Medication 37.5 MCG: at 09:42

## 2018-09-25 RX ADMIN — IPRATROPIUM BROMIDE AND ALBUTEROL SULFATE 3 ML: 2.5; .5 SOLUTION RESPIRATORY (INHALATION) at 00:04

## 2018-09-25 RX ADMIN — OMEPRAZOLE 20 MG: 20 CAPSULE, DELAYED RELEASE ORAL at 09:42

## 2018-09-25 RX ADMIN — ATORVASTATIN CALCIUM 40 MG: 40 TABLET, FILM COATED ORAL at 14:19

## 2018-09-25 RX ADMIN — Medication 1 LOZENGE: at 19:03

## 2018-09-25 RX ADMIN — IPRATROPIUM BROMIDE AND ALBUTEROL SULFATE 3 ML: 2.5; .5 SOLUTION RESPIRATORY (INHALATION) at 11:46

## 2018-09-25 RX ADMIN — LEVOTHYROXINE SODIUM 100 MCG: 100 TABLET ORAL at 14:18

## 2018-09-25 RX ADMIN — IPRATROPIUM BROMIDE AND ALBUTEROL SULFATE 3 ML: 2.5; .5 SOLUTION RESPIRATORY (INHALATION) at 03:53

## 2018-09-25 RX ADMIN — OXYBUTYNIN CHLORIDE 10 MG: 10 TABLET, EXTENDED RELEASE ORAL at 16:28

## 2018-09-25 RX ADMIN — CEFTRIAXONE SODIUM 2 G: 2 INJECTION, POWDER, FOR SOLUTION INTRAMUSCULAR; INTRAVENOUS at 12:07

## 2018-09-25 ASSESSMENT — ACTIVITIES OF DAILY LIVING (ADL)
ADLS_ACUITY_SCORE: 11

## 2018-09-25 NOTE — PHARMACY-ADMISSION MEDICATION HISTORY
Admission medication history interview status for the 9/24/2018  admission is complete. See EPIC admission navigator for prior to admission medications     Medication history source reliability:Moderate, called patient's pharmacy     Actions taken by pharmacist (provider contacted, etc): verified fill history     Additional medication history information not noted on PTA med list :None    Medication reconciliation/reorder completed by provider prior to medication history? No    Time spent in this activity: 10 minutes     Prior to Admission medications    Medication Sig Last Dose Taking? Auth Provider   atorvastatin (LIPITOR) 40 MG tablet Take 40 mg by mouth daily  Yes Unknown, Entered By History   citalopram (CELEXA) 40 MG tablet Take 40 mg by mouth daily  Yes Unknown, Entered By History   levothyroxine (SYNTHROID/LEVOTHROID) 137 MCG tablet Take 137 mcg by mouth daily  Yes Unknown, Entered By History   metFORMIN (GLUCOPHAGE-XR) 500 MG 24 hr tablet Take 1,000 mg by mouth daily (with breakfast)  Yes Unknown, Entered By History   metoprolol succinate (TOPROL-XL) 25 MG 24 hr tablet Take 25 mg by mouth daily  Yes Unknown, Entered By History   omeprazole (PRILOSEC) 20 MG CR capsule Take 20 mg by mouth daily  Yes Unknown, Entered By History   oxybutynin (DITROPAN-XL) 10 MG 24 hr tablet Take 10 mg by mouth daily  Yes Unknown, Entered By History

## 2018-09-25 NOTE — PROGRESS NOTES
Patient placed on BIPAP 12/5 40% for increasing work of breathing and tolerated well. SpO2 high 90`s. Neb tx given inline. BBS coarse. Gel pad in place. Alarm sound set at 10. Will continue to follow.

## 2018-09-25 NOTE — ED NOTES
[facility]  ED Nurse Handoff Report    ED Chief complaint: Shortness of Breath (Pt has been [place] a [place] 9 [place]. Pt states that she developed SOB last Friday. Pt just got off a 9 hr flight from [place]. )      ED Diagnosis:   Final diagnoses:   Bilateral pneumonia       Code Status: Full Code    Allergies:   Allergies   Allergen Reactions     Sulfa Drugs        Activity level - Baseline/Home:  Independent    Activity Level - Current:   Stand with Assist     Needed?: No    Isolation: Yes  Infection: Other, pneumonia (infectious organism)  Bariatric?: No    Vital Signs:   Vitals:    09/24/18 1942 09/24/18 1943 09/24/18 1958 09/24/18 2130   BP: 129/68      Resp:   20    Temp:       TempSrc:       SpO2:  95% 98% 96%   Height:           Cardiac Rhythm: ,        Pain level: 0-10 Pain Scale: 0    Is this patient confused?: No   [place] - Suicide Severity Rating Scale Completed?  Yes  If yes, what color did the patient score?  White    Patient Report: Initial Complaint: Alaina Fernandez is a 63 year old female who presents with shortness of breath.  Focused Assessment: Cardiac: ST, CP with coughing  Resp: SOB, TYSON, Productive cough, LS's diminished, crackles, expiratory wheeze  Neuro: A&Ox4, fatigue, increased weakness  Musculoskeletal: Body aches  Tests Performed: Labs, Xray, CT  Abnormal Results:   Comprehensive metabolic panel Resulted Abnormal Result -   Potassium: 3.0 mmol/L [Ref Range: 3.4 - 5.3]  Glucose: 188 mg/dL [Ref Range: 70 - 99]  Bilirubin Total: 1.6 mg/dL [Ref Range: 0.2 - 1.3]  Collected: 9/24/2018 18:45  Last updated: 9/24/2018 19:34     D dimer quantitative Resulted Abnormal Result -   D Dimer: 2.2 ug/ml FEU [Ref Range: 0.0 - 0.50] (This D-dimer assay is intended for use in conjunction with a clinical pretest   probability assessment model to exclude pulmonary embolism (PE) and deep   venous thrombosis (DVT) in outpatients suspected of PE or DVT. The  cut-off   value is 0.5 ug/mL FEU.   )  Collected: 9/24/2018 18:45  Last updated: 9/24/2018 20:11         Treatments provided: NS 2000ml, zithromax 500mg, albuterol 5mg, rocephin 2g, benadryl 50mg, levalbuterol 1.25, solumedrol 125mg, potassium chloride 40meq,     Family Comments:  at bedside    OBS brochure/video discussed/provided to patient/family: No              Name of person given brochure if not patient: n/a              Relationship to patient: n/a    ED Medications:   Medications   azithromycin (ZITHROMAX) 500 mg in sodium chloride 0.9 % 250 mL intermittent infusion (500 mg Intravenous New Bag 9/24/18 2105)   HOLD: All Oral Medications (not administered)   levalbuterol (XOPENEX CONC) neb solution 1.25 mg (not administered)   0.9% sodium chloride BOLUS (2,000 mLs Intravenous New Bag 9/24/18 1906)   albuterol neb solution 5 mg (5 mg Nebulization Given 9/24/18 1946)   cefTRIAXone (ROCEPHIN) 2 g vial to attach to  ml bag for ADULTS or NS 50 ml bag for PEDS (0 g Intravenous Stopped 9/24/18 2020)   potassium chloride (KLOR-CON) Packet 40 mEq (40 mEq Oral Given 9/24/18 2010)   iopamidol (ISOVUE-370) solution 77 mL (77 mLs Intravenous Given 9/24/18 2059)   Saline (100 mLs As instructed Given 9/24/18 2059)   diphenhydrAMINE (BENADRYL) injection 50 mg (50 mg Intravenous Given 9/24/18 2122)   methylPREDNISolone sodium succinate (solu-MEDROL) injection 125 mg (125 mg Intravenous Given 9/24/18 2126)       Drips infusing?:  Yes    For the majority of the shift this patient was Green.   Interventions performed were n/a.    Severe Sepsis OR Septic Shock Diagnosis Present: No    To be done/followed up on inpatient unit:  continuing care    ED NURSE PHONE NUMBER: *[phone]

## 2018-09-25 NOTE — H&P
[facility]    History and Physical  Hospitalist       Date of Admission:  9/24/2018  Date of Service (when I saw the patient): 09/24/18    Assessment & Plan   Alaina Fernandez is a 63 year old female who presents with shortness of breath.  Admitted for further evaluation and treatment.    Acute hypoxic respiratory failure, elevated d-dimer: Patient with normal lactic acid, basic natruretic peptide, and troponin on admission.  Renal function and liver function testing with the exception of total bilirubin are normal.  CBC reveals normal white blood cell count and hemoglobin.  Patient with d-dimer of 2.2 on admission.  Chest x-ray completed in the emergency department showing small infiltrate in the medial segment of the right middle lobe consistent with pneumonia.  A CT scan with contrast was completed with no pulmonary embolism seen, small areas of increased density throughout both lungs consistent with small infiltrates or atelectasis reported.  Patient vital signs on admission included temperature 100.7, heart rate of 116, respirations at 17 on BiPAP, blood pressure 124/69, saturating 98% on 40% FiO2 BiPAP settings.  - Blood cultures pending ×2.  - Legionella antigen pending.  - Bilateral lower extremity ultrasound  - Pulmonary consult  - Levaquin.  - IV fluids NS at 100cc/h.   - Pro calcitonin pending.  - Supportive cares.  - Pulmonary hygiene.  - Respiratory therapy.  - BIPAP prn.   - Close hemodynamic monitoring.    Cardiac, hyperlipidemia: Patient maintained on atorvastatin as an outpatient.  - Hold atorvastatin at this time.    Diabetes: Patient maintained on metformin as an outpatient.  - Insulin sliding scale.    Hypokalemia: Patient with potassium level of 3.0 on admission.  - Monitor and replete.    Elevated total bilirubin: Patient with bilirubin at 1.6 on admission.  - Monitor.    Hypothyroidism: Stable.  - Continue prior to admission levothyroxine.  - TSH.     DVT Prophylaxis:  Pneumatic Compression Devices  Code Status: Full Code    Disposition: Inpatient.    Dr. Valdemar Nance D.O.  [facility] Hospitalist  Pager [phone]    Primary Care Physician   Carlita Beyer    Chief Complaint   Shortness of breath.     History is obtained from the patient and medical records.     History of Present Illness   Alaina Fernandez is a 63 year old female who presents with shortness of breath.  Admitted for further evaluation and treatment. Patient with normal lactic acid, basic natruretic peptide, and troponin on admission.  Renal function and liver function testing with the exception of total bilirubin are normal.  CBC reveals normal white blood cell count and hemoglobin.  Patient with d-dimer of 2.2 on admission.  Chest x-ray completed in the emergency department showing small infiltrate in the medial segment of the right middle lobe consistent with pneumonia.  A CT scan with contrast was completed with no pulmonary embolism seen, small areas of increased density throughout both lungs consistent with small infiltrates or atelectasis reported.  Patient vital signs on admission included temperature 100.7, heart rate of 116, respirations at 17 on BiPAP, blood pressure 124/69, saturating 98% on 40% FiO2 BiPAP settings.  Patient states that she had just returned from a vacation for approximately 10 days traveling [place] including [place] and many  cities.  Patient states that she has felt ill since landing on the plane today and reported to the emergency department with fever, cough, shortness of breath and feeling ill.  Patient endorses intermittent chest pain with coughing, denies abdominal pain or vomiting, however, does endorse fever.  Denies lower extremity edema or rash.    Past Medical History    I have reviewed this patient's medical history and updated it with pertinent information if needed.   Past Medical History:   Diagnosis Date     Diabetes (H)         Past Surgical History   I have reviewed this patient's surgical history and updated it with pertinent information if needed.  History reviewed. No pertinent surgical history.    Prior to Admission Medications   Prior to Admission Medications   Prescriptions Last Dose Informant Patient Reported? Taking?   ATORVASTATIN CALCIUM PO 9/24/2018 at am  Yes Yes   Sig: Take 20 mg by mouth daily    Levothyroxine Sodium (SYNTHROID PO) 9/24/2018 at am  Yes Yes   Sig: Take 37.5 mcg by mouth daily    METFORMIN HCL PO 9/24/2018 at am  Yes Yes   Sig: Take by mouth daily (with breakfast) 2 tabs qam   Omeprazole (PRILOSEC PO) 9/24/2018 at am  Yes Yes   Sig: Take 20 mg by mouth every morning    Tolterodine Tartrate (DETROL PO) 9/24/2018 at am  Yes Yes   Sig: Take by mouth daily      Facility-Administered Medications: None     Allergies   Allergies   Allergen Reactions     Sulfa Drugs        Social History   I have reviewed this patient's social history and updated it with pertinent information if needed. Alaina Fernandez  reports that she has never smoked. She has never used smokeless tobacco. She reports that she drinks alcohol. She reports that she does not use illicit drugs.    Family History   I have reviewed this patient's family history and updated it with pertinent information if needed.  Reports family history of hypertension.    Review of Systems   The 10 point Review of Systems is negative other than noted in the HPI or here.    Physical Exam   Temp: 100.7  F (38.2  C) Temp src: Oral BP: 129/68   Heart Rate: 116 Resp: 20 SpO2: 97 % O2 Device: BiPAP/CPAP    Vital Signs with Ranges  Temp:  [100.7  F (38.2  C)] 100.7  F (38.2  C)  Heart Rate:  [109-125] 116  Resp:  [20] 20  BP: (118-129)/(68) 129/68  FiO2 (%):  [40 %] 40 %  SpO2:  [88 %-98 %] 97 %  0 lbs 0 oz    GENERAL: Alert and oriented. NAD. Conversational, appropriate.   HEENT: Normocephalic. EOMI. No icterus or injection.  BiPAP in place  LUNGS: Coarse with wheezing  bilaterally. No dyspnea at rest.   HEART: Regular rate, tachycardic. Extremities perfused.   ABDOMEN: Soft, nontender, and nondistended. Positive bowel sounds.   EXTREMITIES: No LE edema noted.   NEUROLOGIC: Moves extremities x4 on command. No acute focal neurologic abnormalities noted.     Data   Data reviewed today:  I personally reviewed both laboratory and imaging data.     Recent Labs  Lab 09/24/18  1845   WBC 10.3   HGB 11.8   MCV 92         POTASSIUM 3.0*   CHLORIDE 103   CO2 24   BUN 10   CR 0.65   ANIONGAP 11   JENNYFER 8.7   *   ALBUMIN 3.4   PROTTOTAL 7.9   BILITOTAL 1.6*   ALKPHOS 101   ALT 29   AST 18   TROPI <0.015       Recent Results (from the past 24 hour(s))   Chest XR,  PA & LAT    Narrative    CHEST TWO VIEWS  9/24/2018 7:20 PM     HISTORY: Check for pneumonia, short of breath, fever, chest pain with  cough, crackles left lung.     COMPARISON: None.      Impression    IMPRESSION: Small infiltrate in the medial segment of the right middle  lobe consistent with pneumonia. Otherwise normal.    SLOANE GOVEA MD   Chest CT, IV contrast only - PE protocol    Narrative    CT CHEST WITH CONTRAST  9/24/2018 9:04 PM    HISTORY: Shortness of breath, cough, chest pain, and elevated D-dimer  with recent travel. Evaluate for pulmonary embolism and pneumonia.    COMPARISON: Radiographs earlier today.    TECHNIQUE: Routine transverse CT imaging of the chest was performed  following the uneventful intravenous administration of 77mL Isovue-370  contrast. A pulmonary embolism protocol was utilized. Radiation dose  for this scan was reduced using automated exposure control, adjustment  of the mA and/or kV according to patient size, or iterative  reconstruction technique.    FINDINGS: The heart size is normal. There are a few nonenlarged  mediastinal lymph nodes with no enlarged lymph node or other abnormal  mass. The thoracic aorta is unremarkable. There is very good  opacification of the pulmonary  arteries with contrast. No pulmonary  embolism is seen. There are a few scattered faint infiltrates or  atelectasis throughout both lungs with no extensive pulmonary  consolidation seen. No abnormal mass is identified. No pneumothorax is  demonstrated. No pleural effusion is identified. There are  degenerative changes in the spine. No other osseous abnormality is  seen. No chest wall pathology is seen. The visualized upper abdomen is  unremarkable.      Impression    IMPRESSION:   1. No pulmonary embolism is seen.  2. Small areas of increased density throughout both lungs consistent  with small infiltrates or atelectasis. No distinct mass is seen.    FABIANO ORTIZ MD

## 2018-09-25 NOTE — PROGRESS NOTES
[facility]    Hospitalist Progress Note    Assessment & Plan   Alaina Fernandez is a 63 year old female who presents with shortness of breath.  Admitted for further evaluation and treatment.     Acute hypoxic respiratory failure due to community acquired pneumonia:   -Patient with normal lactic acid, basic natruretic peptide, and troponin on admission.    -CT Chest with contrast with no pulmonary embolism, small areas of increased density throughout both lungs consistent with small infiltrates or atelectasis reported.  Patient vital signs on admission included temperature 100.7, heart rate of 116, respirations at 17   -initially placed on BIPAP as she was hypoxic; tachypneic and wheezy  -Blood cultures pending ×2.  -Legionella antigen- negative  -continue Rocephin and azithromycin  -weaned off BIPAP this morning.  -Mobilize; IS  -discontinue IMC     Hyperlipidemia:  -resume atorvastatin at dc.     Diabetes Mellitus type 2:   -hold PTA metformin.  -SSI     Hypokalemia:   -Patient with potassium level of 3.0 on admission.  -replacement protocol     Elevated total bilirubin: Patient with bilirubin at 1.6 on admission.  -improved     Hypothyroidism:  -Continue prior to admission levothyroxine.  -TSH slightly suppressed; will check FT4.     # Pain Assessment:  Current Pain Score 9/25/2018   Patient currently in pain? denies   Pain score (0-10) -   Alaina apple pain level was assessed and she currently denies pain.        D/W: RN  DVT Prophylaxis: Pneumatic Compression Devices  Code Status: Full Code    Disposition: Expected discharge in 1-2 days    Moises Epperson MD    Interval History   Dyspnea better. Cough with sputum production. Fever upto 100.7. Off BIPAP. No CP; No n/v/d    -Data reviewed today: I reviewed all new labs and imaging results over the last 24 hours. I personally reviewed no images or EKG's today.      Physical Exam   Temp: 100  F (37.8  C) Temp src: Axillary BP: 98/84   Heart Rate: 98  Resp: 20 SpO2: 92 % O2 Device: None (Room air)    Vitals:    09/24/18 2300 09/25/18 0600   Weight: 101.2 kg (223 lb 1.7 oz) 101.1 kg (222 lb 14.2 oz)     Vital Signs with Ranges  Temp:  [99.3  F (37.4  C)-100.7  F (38.2  C)] 100  F (37.8  C)  Heart Rate:  [] 98  Resp:  [11-30] 20  BP: ()/(65-93) 98/84  FiO2 (%):  [40 %] 40 %  SpO2:  [86 %-100 %] 92 %  I/O last 3 completed shifts:  In: 710 [P.O.:30; I.V.:680]  Out: 700 [Urine:700]    Constitutional: AAOX3, NAD, Appears comfortable  HEENT: No pallor or icterus  Neck- Supple, Good ROM, No JVD  Respiratory:  Coarse BS b/l; no  wheezes, Normal WOB  Cardiovascular: RRR, No murmur  GI: Soft, Non- tender, BS- normoactive  Skin/Integument: Warm and dry, no rashes  MSK: No joint deformity or swelling, no edema  Neuro: CN- grossly intact      Medications       sodium chloride 100 mL/hr at 09/24/18 2312         [START ON 9/26/2018] azithromycin  250 mg Oral Daily     cefTRIAXone  2 g Intravenous Q24H     influenza vaccine adult (product based on age)  0.5 mL Intramuscular Prior to discharge     insulin aspart  1-7 Units Subcutaneous TID AC     insulin aspart  1-5 Units Subcutaneous At Bedtime     levothyroxine (SYNTHROID/LEVOTHROID) half-tab 37.5 mcg  37.5 mcg Oral Daily     omeprazole (priLOSEC) CR capsule 20 mg  20 mg Oral QAM     sodium chloride (PF)  3 mL Intracatheter Q8H       Data       Recent Labs  Lab 09/25/18  0535 09/24/18  1845   WBC 4.5 10.3   HGB 10.6* 11.8   MCV 93 92   PLT 78* 232    138   POTASSIUM 3.9 3.0*   CHLORIDE 112* 103   CO2 26 24   BUN 9 10   CR 0.62 0.65   ANIONGAP 5 11   JENNYFER 8.2* 8.7   * 188*   ALBUMIN 2.8* 3.4   PROTTOTAL 7.0 7.9   BILITOTAL 0.5 1.6*   ALKPHOS 84 101   ALT 26 29   AST 18 18   TROPI  --  <0.015       Recent Results (from the past 24 hour(s))   Chest XR,  PA & LAT    Narrative    CHEST TWO VIEWS  9/24/2018 7:20 PM     HISTORY: Check for pneumonia, short of breath, fever, chest pain with  cough, crackles left  lung.     COMPARISON: None.      Impression    IMPRESSION: Small infiltrate in the medial segment of the right middle  lobe consistent with pneumonia. Otherwise normal.    SLOANE GOVEA MD   Chest CT, IV contrast only - PE protocol    Narrative    CT CHEST WITH CONTRAST  9/24/2018 9:04 PM    HISTORY: Shortness of breath, cough, chest pain, and elevated D-dimer  with recent travel. Evaluate for pulmonary embolism and pneumonia.    COMPARISON: Radiographs earlier today.    TECHNIQUE: Routine transverse CT imaging of the chest was performed  following the uneventful intravenous administration of 77mL Isovue-370  contrast. A pulmonary embolism protocol was utilized. Radiation dose  for this scan was reduced using automated exposure control, adjustment  of the mA and/or kV according to patient size, or iterative  reconstruction technique.    FINDINGS: The heart size is normal. There are a few nonenlarged  mediastinal lymph nodes with no enlarged lymph node or other abnormal  mass. The thoracic aorta is unremarkable. There is very good  opacification of the pulmonary arteries with contrast. No pulmonary  embolism is seen. There are a few scattered faint infiltrates or  atelectasis throughout both lungs with no extensive pulmonary  consolidation seen. No abnormal mass is identified. No pneumothorax is  demonstrated. No pleural effusion is identified. There are  degenerative changes in the spine. No other osseous abnormality is  seen. No chest wall pathology is seen. The visualized upper abdomen is  unremarkable.      Impression    IMPRESSION:   1. No pulmonary embolism is seen.  2. Small areas of increased density throughout both lungs consistent  with small infiltrates or atelectasis. No distinct mass is seen.    FABIANO ORTIZ MD

## 2018-09-25 NOTE — CONSULTS
Pulmonary Medicine Consultation        Date of Admission: 9/24/2018  Primary Attending:  Valdemar Nacne,   Consulting Physician: Leonel Summers?MD parrish      History:    Alaina Fernandez is a 63 year old female with past medical history of diabetes, obesity, recently returning from 10 days traveling throughout [place] including [place] and many  cities.  who presents with shortness of breath.  Complained of, cough associated with sputum production but no hemoptysis was found to be mildly hypoxic on admission    Chest x-ray completed in the emergency department showing small infiltrate in the medial segment of the right middle lobe consistent with pneumonia.  A CT scan with contrast was completed with no pulmonary embolism seen, small areas of increased density throughout both lungs consistent with small infiltrates or atelectasis reported.  Patient vital signs on admission included temperature 100.7, heart rate of 116, respirations at 17 on BiPAP, blood pressure 124/69, saturating 98% on 40% FiO2 BiPAP settings.  felt ill since landing on the plane  and reported to the emergency department with fever, cough, shortness of breath and feeling ill.  Patient endorses intermittent chest pain with coughing, denies abdominal pain or vomiting, however, does endorse fever.           Review of system:   ROS is negative except for items mentioned above and in HPI.           Prior medical history:  Past Medical History:   Diagnosis Date     Diabetes (H)        History reviewed. No pertinent surgical history.    Patient Active Problem List   Diagnosis     Acute respiratory failure with hypoxia (H)       Social History     Social History     Social History     Marital status:      Spouse name: N/A     Number of children: N/A     Years of education: N/A     Occupational History     Not on file.     Social History Main Topics     Smoking status: Never Smoker     Smokeless tobacco: Never Used      "Alcohol use Yes      Comment: \"4 nights a week\"     Drug use: No     Sexual activity: Not on file     Other Topics Concern     Not on file     Social History Narrative     No narrative on file         Family History  No family history on file.        Medications  No current outpatient prescriptions on file.     Current Facility-Administered Medications Ordered in Epic   Medication Dose Route Frequency Last Rate Last Dose     acetaminophen (TYLENOL) Suppository 650 mg  650 mg Rectal Q4H PRN         acetaminophen (TYLENOL) tablet 650 mg  650 mg Oral Q4H PRN         [START ON 9/26/2018] azithromycin (ZITHROMAX) tablet 250 mg  250 mg Oral Daily         bisacodyl (DULCOLAX) Suppository 10 mg  10 mg Rectal Daily PRN         cefTRIAXone (ROCEPHIN) 2 g vial to attach to  ml bag for ADULTS or NS 50 ml bag for PEDS  2 g Intravenous Q24H         glucose gel 15-30 g  15-30 g Oral Q15 Min PRN        Or     dextrose 50 % injection 25-50 mL  25-50 mL Intravenous Q15 Min PRN        Or     glucagon injection 1 mg  1 mg Subcutaneous Q15 Min PRN         guaiFENesin-dextromethorphan (ROBITUSSIN DM) 100-10 MG/5ML syrup 5 mL  5 mL Oral Q4H PRN         HOLD: All Oral Medications   Does not apply HOLD         influenza recomb quadrivalent PF (FLUBLOK) injection 0.5 mL  0.5 mL Intramuscular Prior to discharge         insulin aspart (NovoLOG) inj (RAPID ACTING)  1-7 Units Subcutaneous TID AC         insulin aspart (NovoLOG) inj (RAPID ACTING)  1-5 Units Subcutaneous At Bedtime         ipratropium - albuterol 0.5 mg/2.5 mg/3 mL (DUONEB) neb solution 3 mL  3 mL Nebulization Q4H PRN   3 mL at 09/25/18 0353     levothyroxine (SYNTHROID/LEVOTHROID) half-tab 37.5 mcg  37.5 mcg Oral Daily         lidocaine (LMX4) cream   Topical Q1H PRN         lidocaine 1 % 1 mL  1 mL Other Q1H PRN         LORazepam (ATIVAN) injection 0.5-1 mg  0.5-1 mg Intravenous Q4H PRN         magnesium sulfate 4 g in 100 mL sterile water (premade)  4 g Intravenous Q4H " PRN         melatonin tablet 1 mg  1 mg Oral At Bedtime PRN         naloxone (NARCAN) injection 0.1-0.4 mg  0.1-0.4 mg Intravenous Q2 Min PRN         omeprazole (priLOSEC) CR capsule 20 mg  20 mg Oral QAM         ondansetron (ZOFRAN-ODT) ODT tab 4 mg  4 mg Oral Q6H PRN        Or     ondansetron (ZOFRAN) injection 4 mg  4 mg Intravenous Q6H PRN         polyethylene glycol (MIRALAX/GLYCOLAX) Packet 17 g  17 g Oral Daily PRN         potassium chloride (KLOR-CON) Packet 20-40 mEq  20-40 mEq Oral or Feeding Tube Q2H PRN         potassium chloride 10 mEq in 100 mL intermittent infusion with 10 mg lidocaine  10 mEq Intravenous Q1H PRN         potassium chloride 10 mEq in 100 mL sterile water intermittent infusion (premix)  10 mEq Intravenous Q1H PRN         potassium chloride 20 mEq in 50 mL intermittent infusion  20 mEq Intravenous Q1H PRN         potassium chloride SA (K-DUR/KLOR-CON M) CR tablet 20-40 mEq  20-40 mEq Oral Q2H PRN         senna-docusate (SENOKOT-S;PERICOLACE) 8.6-50 MG per tablet 1 tablet  1 tablet Oral BID PRN        Or     senna-docusate (SENOKOT-S;PERICOLACE) 8.6-50 MG per tablet 2 tablet  2 tablet Oral BID PRN         sodium chloride (PF) 0.9% PF flush 3 mL  3 mL Intracatheter Q1H PRN         sodium chloride (PF) 0.9% PF flush 3 mL  3 mL Intracatheter Q8H   3 mL at 18 2313     sodium chloride 0.9% infusion   Intravenous Continuous 100 mL/hr at 18 2312       No current Epic-ordered outpatient prescriptions on file.       Allergies   Allergen Reactions     Sulfa Drugs                Physical Examination:   Vitals:    18 0200 18 0400 18 0600 18 0800   BP: 118/72 98/84  129/72   Resp: 20 21   Temp:    99.1  F (37.3  C)   TempSrc:    Oral   SpO2: 99% 100% 92% 93%   Weight:   101.1 kg (222 lb 14.2 oz)    Height:         Body mass index is 39.48 kg/(m^2).  Temp (24hrs), Av.9  F (37.7  C), Min:99.1  F (37.3  C), Max:100.7  F (38.2  C)        Constitutional:   Appears comfortable.  HENT:  mucous membranes moist.  Eyes: PERRLA, no icterus, no pallor.   Neck: No lymphadenopathy or thyromegaly, trachea midline, no carotid bruits.  Cardiovascular: Regular rate and rythym, no murmurs, rubs or gallops, no peripheral edema.  Respiratory/Chest: Bronchial breath sounds on right base otherwise vesicular breath sounds   Gastrointestinal: Protuberant abdomen,Abdomen was soft, non-tender, non-distended, no masses felt, no hepatosplenomegaly.  Musculoskeletal: No clubbing or cyanosis, full range of motion in all extremities.  Neurological: No focal motor or sensory deficits. DTR's are 2+ and symmetric.  Skin: No skin rash, hives, petechiae, or breakdown.        CMP  Recent Labs  Lab 09/25/18  0535 09/24/18  1845    138   POTASSIUM 3.9 3.0*   CHLORIDE 112* 103   CO2 26 24   ANIONGAP 5 11   * 188*   BUN 9 10   CR 0.62 0.65   GFRESTIMATED >90 >90   GFRESTBLACK >90 >90   JENNYFER 8.2* 8.7   PROTTOTAL 7.0 7.9   ALBUMIN 2.8* 3.4   BILITOTAL 0.5 1.6*   ALKPHOS 84 101   AST 18 18   ALT 26 29     CBC  Recent Labs  Lab 09/25/18  0535 09/24/18  1845   WBC 4.5 10.3   RBC 3.41* 3.80   HGB 10.6* 11.8   HCT 31.6* 35.0   MCV 93 92   MCH 31.1 31.1   MCHC 33.5 33.7   RDW 14.3 14.2   PLT 78* 232     INRNo lab results found in last 7 days.  Arterial Blood GasNo lab results found in last 7 days.    Recent Labs  Lab 09/24/18  1949 09/24/18  1845   CULT No growth after 8 hours No growth after 8 hours       Diagnostic Studies:  Chest Radiology:    CT CHEST WITH CONTRAST  9/24/2018 9:04 PM     HISTORY: Shortness of breath, cough, chest pain, and elevated D-dimer  with recent travel. Evaluate for pulmonary embolism and pneumonia.     COMPARISON: Radiographs earlier today.        FINDINGS: The heart size is normal. There are a few nonenlarged  mediastinal lymph nodes with no enlarged lymph node or other abnormal  mass. The thoracic aorta is unremarkable. There is very good  opacification of the pulmonary  arteries with contrast. No pulmonary  embolism is seen. There are a few scattered faint infiltrates or  atelectasis throughout both lungs with no extensive pulmonary  consolidation seen. No abnormal mass is identified. No pneumothorax is  demonstrated. No pleural effusion is identified. There are  degenerative changes in the spine. No other osseous abnormality is  seen. No chest wall pathology is seen. The visualized upper abdomen is  unremarkable.         IMPRESSION:   1. No pulmonary embolism is seen.  2. Small areas of increased density throughout both lungs consistent  with small infiltrates or atelectasis. No distinct mass is seen.         Assessment:     63 year old female with past medical history of diabetes, obesity, recently returning from 10 days traveling throughout the [place] including [place]  cities.  who presents with shortness of breath.  Complained of, cough associated with sputum production but no hemoptysis was found to be mildly hypoxic on admission    Chest x-ray completed in the emergency department showing small infiltrate in the medial segment of the right middle lobe consistent with pneumonia.  A CT scan with contrast was completed with no pulmonary embolism seen, small areas of increased density throughout both lungs consistent with small infiltrates or atelectasis reported.  Patient vital signs on admission included temperature 100.7, heart rate of 116, respirations at 17 on BiPAP, blood pressure 124/69, saturating 98% on 40% FiO2 BiPAP settings.  felt ill since landing on the plane  and reported to the emergency department with fever, cough, shortness of breath and feeling ill.      Pulmonary Diagnoses: Abnl CT/CXR R91.8, Cough R05, TYSON R06.09, Pnemonia unspec J18.9 and SOB R06.02    Recommendations        Imaging, and presentation consistent with infectious pneumonia  Patient has shown dramatic improvement since admission currently on ceftriaxone and  azithromycin  Complete 7 days of antibiotics transition to oral antibiotics on discharge  Follow-up in 2 weeks with primary care provider  Repeat CT chest in 6-8 weeks to assess resolution of infiltrates.  Should undergo polysomnography due to complaints of snoring and witnessed apneas concerning for sleep disordered breathing   Supplemental oxygen no longer needed as patient is on room air  Physical activity as tolerated  Arrange pulmonary/ Sleep follow up [phone]  , please call if questions    Leonel Loera M.D.  Pulmonary, Critical Care and Sleep Medicine  [facility]   Pager: [phone]  Office:[phone]

## 2018-09-25 NOTE — PLAN OF CARE
Problem: Patient Care Overview  Goal: Plan of Care/Patient Progress Review  A&Ox4. VSS on 2L. Independent. Regular diet. LS coarse, BS active, + flatus. Sputum sample sent to lab. Had ultrasound today. Voiding adequately. Denies pain.

## 2018-09-25 NOTE — PROGRESS NOTES
Pt on BIPAP 12/5 .40% throughout the night.recieved prn treatments inline with Bipap. Gel pad and metplix placed. Skin intact. Resting comfortably on Bipap. Vital signs stable. Alarm Volume set to 10. Rt will continue to follow up with patient.   9/25/2018  Osob MUNDO Hogue

## 2018-09-26 LAB
GLUCOSE BLDC GLUCOMTR-MCNC: 110 MG/DL (ref 70–99)
GLUCOSE BLDC GLUCOMTR-MCNC: 113 MG/DL (ref 70–99)
GLUCOSE BLDC GLUCOMTR-MCNC: 115 MG/DL (ref 70–99)
GLUCOSE BLDC GLUCOMTR-MCNC: 137 MG/DL (ref 70–99)
GLUCOSE BLDC GLUCOMTR-MCNC: 199 MG/DL (ref 70–99)

## 2018-09-26 PROCEDURE — 25000125 ZZHC RX 250: Performed by: INTERNAL MEDICINE

## 2018-09-26 PROCEDURE — 25000132 ZZH RX MED GY IP 250 OP 250 PS 637: Performed by: INTERNAL MEDICINE

## 2018-09-26 PROCEDURE — 12000007 ZZH R&B INTERMEDIATE

## 2018-09-26 PROCEDURE — 25000125 ZZHC RX 250: Performed by: HOSPITALIST

## 2018-09-26 PROCEDURE — 94660 CPAP INITIATION&MGMT: CPT

## 2018-09-26 PROCEDURE — 00000146 ZZHCL STATISTIC GLUCOSE BY METER IP

## 2018-09-26 PROCEDURE — 25000132 ZZH RX MED GY IP 250 OP 250 PS 637: Performed by: HOSPITALIST

## 2018-09-26 PROCEDURE — 25000128 H RX IP 250 OP 636: Performed by: INTERNAL MEDICINE

## 2018-09-26 PROCEDURE — 99233 SBSQ HOSP IP/OBS HIGH 50: CPT | Performed by: INTERNAL MEDICINE

## 2018-09-26 PROCEDURE — 25000128 H RX IP 250 OP 636: Performed by: HOSPITALIST

## 2018-09-26 PROCEDURE — 25000132 ZZH RX MED GY IP 250 OP 250 PS 637

## 2018-09-26 PROCEDURE — 40000275 ZZH STATISTIC RCP TIME EA 10 MIN

## 2018-09-26 RX ORDER — BENZONATATE 100 MG/1
100 CAPSULE ORAL 3 TIMES DAILY PRN
Status: DISCONTINUED | OUTPATIENT
Start: 2018-09-26 | End: 2018-09-29 | Stop reason: HOSPADM

## 2018-09-26 RX ORDER — GUAIFENESIN/DEXTROMETHORPHAN 100-10MG/5
5 SYRUP ORAL EVERY 4 HOURS PRN
Status: DISCONTINUED | OUTPATIENT
Start: 2018-09-26 | End: 2018-09-26

## 2018-09-26 RX ORDER — PREDNISONE 20 MG/1
40 TABLET ORAL DAILY
Status: DISCONTINUED | OUTPATIENT
Start: 2018-09-26 | End: 2018-09-27

## 2018-09-26 RX ADMIN — GUAIFENESIN AND DEXTROMETHORPHAN 5 ML: 100; 10 SYRUP ORAL at 02:09

## 2018-09-26 RX ADMIN — AZITHROMYCIN MONOHYDRATE 250 MG: 250 TABLET ORAL at 09:22

## 2018-09-26 RX ADMIN — IPRATROPIUM BROMIDE AND ALBUTEROL SULFATE 3 ML: 2.5; .5 SOLUTION RESPIRATORY (INHALATION) at 02:20

## 2018-09-26 RX ADMIN — CEFTRIAXONE SODIUM 2 G: 2 INJECTION, POWDER, FOR SOLUTION INTRAMUSCULAR; INTRAVENOUS at 09:18

## 2018-09-26 RX ADMIN — ATORVASTATIN CALCIUM 40 MG: 40 TABLET, FILM COATED ORAL at 09:23

## 2018-09-26 RX ADMIN — OXYBUTYNIN CHLORIDE 10 MG: 10 TABLET, EXTENDED RELEASE ORAL at 09:22

## 2018-09-26 RX ADMIN — METOPROLOL SUCCINATE 25 MG: 25 TABLET, EXTENDED RELEASE ORAL at 09:22

## 2018-09-26 RX ADMIN — LEVOTHYROXINE SODIUM 137 MCG: 137 TABLET ORAL at 09:21

## 2018-09-26 RX ADMIN — Medication 1 LOZENGE: at 15:32

## 2018-09-26 RX ADMIN — PREDNISONE 40 MG: 20 TABLET ORAL at 09:22

## 2018-09-26 RX ADMIN — LORAZEPAM 0.5 MG: 2 INJECTION INTRAMUSCULAR; INTRAVENOUS at 02:20

## 2018-09-26 RX ADMIN — GUAIFENESIN AND DEXTROMETHORPHAN 5 ML: 100; 10 SYRUP ORAL at 10:00

## 2018-09-26 RX ADMIN — Medication 1 LOZENGE: at 00:32

## 2018-09-26 RX ADMIN — Medication 1 LOZENGE: at 10:01

## 2018-09-26 RX ADMIN — INSULIN ASPART 2 UNITS: 100 INJECTION, SOLUTION INTRAVENOUS; SUBCUTANEOUS at 17:31

## 2018-09-26 RX ADMIN — IPRATROPIUM BROMIDE AND ALBUTEROL SULFATE 3 ML: 2.5; .5 SOLUTION RESPIRATORY (INHALATION) at 17:24

## 2018-09-26 RX ADMIN — OMEPRAZOLE 20 MG: 20 CAPSULE, DELAYED RELEASE ORAL at 09:23

## 2018-09-26 RX ADMIN — CITALOPRAM HYDROBROMIDE 40 MG: 20 TABLET ORAL at 09:23

## 2018-09-26 ASSESSMENT — ACTIVITIES OF DAILY LIVING (ADL)
ADLS_ACUITY_SCORE: 11

## 2018-09-26 NOTE — PLAN OF CARE
Problem: Breathing Pattern Ineffective (Adult)  Goal: Identify Related Risk Factors and Signs and Symptoms  Related risk factors and signs and symptoms are identified upon initiation of Human Response Clinical Practice Guideline (CPG).   Outcome: Improving  Pt up in chair and tolerating today on O2 at 2.5 liters per n/c. Off bipap since 0745 and tolerating reports cough medicine prn has helped some today. Lungs coarse dry non productive cough

## 2018-09-26 NOTE — PROGRESS NOTES
Placed pt on BIPAP 12/5 .40% vital signs stable. Pt resting comfortably. Alarm volume set to 10. Gel pad on metplix placed. RT will continue to follow.     9/26/2018  Osob MUNDO Hogue

## 2018-09-26 NOTE — PLAN OF CARE
Problem: Patient Care Overview  Goal: Plan of Care/Patient Progress Review  Outcome: Improving  Admitted for Chest pain, fever, chills, and SOB. Patient is being treated for bilateral pneumonia. VS wdl on RA. A/Ox4. Patient ulices pain all shift. Up independently in room and halls.  tolerating reg diet. Blood oyfeaq517/161. +gas, -bm. Voiding to toilette. LS diminished throughout, course in lower lobes, stiderous on expiration in upper lobes . IS to 1250.

## 2018-09-26 NOTE — PROGRESS NOTES
[facility]    Hospitalist Progress Note    Assessment & Plan   Alaina Fernandez is a 63 year old female who presents with shortness of breath.  Admitted for further evaluation and treatment.     Acute hypoxic respiratory failure due to community acquired pneumonia:   -Patient with normal lactic acid, basic natruretic peptide, and troponin on admission.    -CT Chest with contrast with no pulmonary embolism, small areas of increased density throughout both lungs consistent with small infiltrates or atelectasis reported.  Patient vital signs on admission included temperature 100.7, heart rate of 116, respirations at 17   -initially placed on BIPAP as she was hypoxic; tachypneic and wheezy  -Legionella antigen- negative  -continue Rocephin and azithromycin  -Patient was weaned off oxygen yesterday however overnight had a coughing spell and again was put back on BiPAP  -Subsequently again weaned off BiPAP, now on 2 L nasal cannula  -Wheezy this morning, will restart prednisone 40 mg daily, anticipate a 5 day course  -Add Robitussin and Tessalon for symptomatic treatment of cough  -Mobilize; IS  -Appreciate pulmonary consult.     Hyperlipidemia:  -resume atorvastatin at dc.     Diabetes Mellitus type 2:   -hold PTA metformin.  -SSI     Hypokalemia:   -Patient with potassium level of 3.0 on admission.  -replacement protocol     Elevated total bilirubin: Patient with bilirubin at 1.6 on admission.  -improved     Hypothyroidism:  -Continue prior to admission levothyroxine.  -TSH slightly suppressed; FT4 is normal, continue prior to admission dose, follow-up as outpatient with PCP.     # Pain Assessment:  Current Pain Score 9/26/2018   Patient currently in pain? denies   Pain score (0-10) -   Alaina apple pain level was assessed and she currently denies pain.        D/W: RN  DVT Prophylaxis: Pneumatic Compression Devices  Code Status: Full Code    Disposition: Expected discharge 9/27 if respiratory status  improved    Moises Epperson MD    Interval History   Was doing well during the day yesterday however overnight went into a coughing spell, was wheezy and felt dyspneic, was placed on BiPAP.  Subsequently off BiPAP and feels slightly better.  Weak and tired.  No chest pain.  Afebrile, cough with scant sputum now.    -Data reviewed today: I reviewed all new labs and imaging results over the last 24 hours. I personally reviewed no images or EKG's today.      Physical Exam   Temp: 98.7  F (37.1  C) Temp src: Axillary BP: 148/79 Pulse: 77 Heart Rate: 81 Resp: 18 SpO2: 99 % O2 Device: BiPAP/CPAP Oxygen Delivery: 2.5 LPM  Vitals:    09/24/18 2300 09/25/18 0600 09/26/18 0656   Weight: 101.2 kg (223 lb 1.7 oz) 101.1 kg (222 lb 14.2 oz) 100.5 kg (221 lb 9 oz)     Vital Signs with Ranges  Temp:  [97.9  F (36.6  C)-99.2  F (37.3  C)] 98.7  F (37.1  C)  Pulse:  [71-77] 77  Heart Rate:  [65-98] 81  Resp:  [16-21] 18  BP: (132-150)/(67-99) 148/79  SpO2:  [88 %-99 %] 99 %  I/O last 3 completed shifts:  In: 775 [P.O.:775]  Out: -     Constitutional: AAOX3, NAD  HEENT: No pallor or icterus  Neck- Supple, Good ROM, No JVD  Respiratory: Bilateral expiratory wheezes, Normal WOB  Cardiovascular: RRR, No murmur  GI: Soft, Non- tender, BS- normoactive  Skin/Integument: Warm and dry, no rashes  MSK: No joint deformity or swelling, no edema  Neuro: CN- grossly intact      Medications           atorvastatin  40 mg Oral Daily     azithromycin  250 mg Oral Daily     cefTRIAXone  2 g Intravenous Q24H     citalopram  40 mg Oral Daily     insulin aspart  1-7 Units Subcutaneous TID AC     insulin aspart  1-5 Units Subcutaneous At Bedtime     levothyroxine  137 mcg Oral QAM AC     metoprolol succinate  25 mg Oral Daily     omeprazole  20 mg Oral Daily     oxybutynin  10 mg Oral Daily     predniSONE  40 mg Oral Daily     sodium chloride (PF)  3 mL Intracatheter Q8H       Data       Recent Labs  Lab 09/25/18  0535 09/24/18  1845   WBC 4.5 10.3   HGB  10.6* 11.8   MCV 93 92   PLT 78* 232    138   POTASSIUM 3.9 3.0*   CHLORIDE 112* 103   CO2 26 24   BUN 9 10   CR 0.62 0.65   ANIONGAP 5 11   JENNYFER 8.2* 8.7   * 188*   ALBUMIN 2.8* 3.4   PROTTOTAL 7.0 7.9   BILITOTAL 0.5 1.6*   ALKPHOS 84 101   ALT 26 29   AST 18 18   TROPI  --  <0.015       Recent Results (from the past 24 hour(s))   US Lower Extremity Venous Duplex Bilateral    Narrative    VENOUS ULTRASOUND BOTH LEGS  9/25/2018 9:16 AM     HISTORY: Rule out DVT, elevated d dimer;     COMPARISON: None.    FINDINGS:  Examination of the deep veins with graded compression and  color flow Doppler with spectral wave form analysis was performed.   There is no evidence for DVT. There are bilateral Baker's cyst.  Baker's cyst on the right measures 2.7 x 1.5 x 0.8 cm. Baker's cyst on  the left measures 4.6 x 2.5 x 1.5 cm.      Impression    IMPRESSION: No evidence of deep venous thrombosis.    FREDDY MULLIGAN MD

## 2018-09-26 NOTE — PLAN OF CARE
Problem: Breathing Pattern Ineffective (Adult)  Goal: Identify Related Risk Factors and Signs and Symptoms  Related risk factors and signs and symptoms are identified upon initiation of Human Response Clinical Practice Guideline (CPG).   Outcome: No Change  A&O. LS dim with bilateral crackles. Went into a coughing fit that pt had difficulty recovering from, some relief from nebulizer. oxygen increased to 2.5L NC, RT then place on BIPAP.Now patient appears to be resting comfortable. prn ativan given x1 for anxiety, denies pain.

## 2018-09-27 LAB
BACTERIA SPEC CULT: NORMAL
GLUCOSE BLDC GLUCOMTR-MCNC: 107 MG/DL (ref 70–99)
GLUCOSE BLDC GLUCOMTR-MCNC: 144 MG/DL (ref 70–99)
GLUCOSE BLDC GLUCOMTR-MCNC: 191 MG/DL (ref 70–99)
GLUCOSE BLDC GLUCOMTR-MCNC: 201 MG/DL (ref 70–99)
GLUCOSE BLDC GLUCOMTR-MCNC: 87 MG/DL (ref 70–99)
SPECIMEN SOURCE: NORMAL

## 2018-09-27 PROCEDURE — 25000132 ZZH RX MED GY IP 250 OP 250 PS 637

## 2018-09-27 PROCEDURE — 25000132 ZZH RX MED GY IP 250 OP 250 PS 637: Performed by: HOSPITALIST

## 2018-09-27 PROCEDURE — 25000125 ZZHC RX 250: Performed by: INTERNAL MEDICINE

## 2018-09-27 PROCEDURE — 12000007 ZZH R&B INTERMEDIATE

## 2018-09-27 PROCEDURE — 25000132 ZZH RX MED GY IP 250 OP 250 PS 637: Performed by: INTERNAL MEDICINE

## 2018-09-27 PROCEDURE — 94640 AIRWAY INHALATION TREATMENT: CPT | Mod: 76

## 2018-09-27 PROCEDURE — 25000128 H RX IP 250 OP 636: Performed by: INTERNAL MEDICINE

## 2018-09-27 PROCEDURE — 40000275 ZZH STATISTIC RCP TIME EA 10 MIN

## 2018-09-27 PROCEDURE — 25000125 ZZHC RX 250: Performed by: HOSPITALIST

## 2018-09-27 PROCEDURE — 00000146 ZZHCL STATISTIC GLUCOSE BY METER IP

## 2018-09-27 PROCEDURE — 94640 AIRWAY INHALATION TREATMENT: CPT

## 2018-09-27 PROCEDURE — 99233 SBSQ HOSP IP/OBS HIGH 50: CPT | Performed by: INTERNAL MEDICINE

## 2018-09-27 RX ORDER — GUAIFENESIN 600 MG/1
600 TABLET, EXTENDED RELEASE ORAL 2 TIMES DAILY
Status: DISCONTINUED | OUTPATIENT
Start: 2018-09-27 | End: 2018-09-29 | Stop reason: HOSPADM

## 2018-09-27 RX ORDER — IPRATROPIUM BROMIDE AND ALBUTEROL SULFATE 2.5; .5 MG/3ML; MG/3ML
3 SOLUTION RESPIRATORY (INHALATION)
Status: DISCONTINUED | OUTPATIENT
Start: 2018-09-27 | End: 2018-09-29 | Stop reason: HOSPADM

## 2018-09-27 RX ORDER — METHYLPREDNISOLONE SODIUM SUCCINATE 125 MG/2ML
60 INJECTION, POWDER, LYOPHILIZED, FOR SOLUTION INTRAMUSCULAR; INTRAVENOUS EVERY 8 HOURS
Status: DISCONTINUED | OUTPATIENT
Start: 2018-09-27 | End: 2018-09-28

## 2018-09-27 RX ADMIN — ACETAMINOPHEN 650 MG: 325 TABLET, FILM COATED ORAL at 11:39

## 2018-09-27 RX ADMIN — OXYBUTYNIN CHLORIDE 10 MG: 10 TABLET, EXTENDED RELEASE ORAL at 08:22

## 2018-09-27 RX ADMIN — Medication 1 LOZENGE: at 08:22

## 2018-09-27 RX ADMIN — METOPROLOL SUCCINATE 25 MG: 25 TABLET, EXTENDED RELEASE ORAL at 08:23

## 2018-09-27 RX ADMIN — IPRATROPIUM BROMIDE AND ALBUTEROL SULFATE 3 ML: 2.5; .5 SOLUTION RESPIRATORY (INHALATION) at 21:16

## 2018-09-27 RX ADMIN — ATORVASTATIN CALCIUM 40 MG: 40 TABLET, FILM COATED ORAL at 08:23

## 2018-09-27 RX ADMIN — CITALOPRAM HYDROBROMIDE 40 MG: 20 TABLET ORAL at 08:23

## 2018-09-27 RX ADMIN — OMEPRAZOLE 20 MG: 20 CAPSULE, DELAYED RELEASE ORAL at 08:23

## 2018-09-27 RX ADMIN — GUAIFENESIN AND DEXTROMETHORPHAN 5 ML: 100; 10 SYRUP ORAL at 19:54

## 2018-09-27 RX ADMIN — METHYLPREDNISOLONE SODIUM SUCCINATE 62.5 MG: 125 INJECTION, POWDER, FOR SOLUTION INTRAMUSCULAR; INTRAVENOUS at 17:14

## 2018-09-27 RX ADMIN — GUAIFENESIN AND DEXTROMETHORPHAN 5 ML: 100; 10 SYRUP ORAL at 05:46

## 2018-09-27 RX ADMIN — IPRATROPIUM BROMIDE AND ALBUTEROL SULFATE 3 ML: 2.5; .5 SOLUTION RESPIRATORY (INHALATION) at 08:30

## 2018-09-27 RX ADMIN — INSULIN ASPART 1 UNITS: 100 INJECTION, SOLUTION INTRAVENOUS; SUBCUTANEOUS at 13:16

## 2018-09-27 RX ADMIN — CEFTRIAXONE SODIUM 2 G: 2 INJECTION, POWDER, FOR SOLUTION INTRAMUSCULAR; INTRAVENOUS at 08:19

## 2018-09-27 RX ADMIN — AZITHROMYCIN MONOHYDRATE 250 MG: 250 TABLET ORAL at 08:23

## 2018-09-27 RX ADMIN — Medication 1 LOZENGE: at 15:31

## 2018-09-27 RX ADMIN — GUAIFENESIN AND DEXTROMETHORPHAN 5 ML: 100; 10 SYRUP ORAL at 15:30

## 2018-09-27 RX ADMIN — GUAIFENESIN AND DEXTROMETHORPHAN 5 ML: 100; 10 SYRUP ORAL at 00:32

## 2018-09-27 RX ADMIN — LEVOTHYROXINE SODIUM 137 MCG: 137 TABLET ORAL at 06:26

## 2018-09-27 RX ADMIN — IPRATROPIUM BROMIDE AND ALBUTEROL SULFATE 3 ML: 2.5; .5 SOLUTION RESPIRATORY (INHALATION) at 17:44

## 2018-09-27 RX ADMIN — GUAIFENESIN 600 MG: 600 TABLET, EXTENDED RELEASE ORAL at 19:55

## 2018-09-27 RX ADMIN — SENNOSIDES AND DOCUSATE SODIUM 2 TABLET: 8.6; 5 TABLET ORAL at 15:35

## 2018-09-27 RX ADMIN — INSULIN ASPART 2 UNITS: 100 INJECTION, SOLUTION INTRAVENOUS; SUBCUTANEOUS at 17:19

## 2018-09-27 RX ADMIN — PREDNISONE 40 MG: 20 TABLET ORAL at 08:23

## 2018-09-27 ASSESSMENT — ACTIVITIES OF DAILY LIVING (ADL)
ADLS_ACUITY_SCORE: 11

## 2018-09-27 NOTE — PLAN OF CARE
Problem: Breathing Pattern Ineffective (Adult)  Goal: Effective Oxygenation/Ventilation  Patient will demonstrate the desired outcomes by discharge/transition of care.   Outcome: Improving  A&O. VSS. Lung sounds quite course. pt requested cough syrup for productive cough this shift. seems to be resting more comfortably on 2L NC. Denies pain.

## 2018-09-27 NOTE — PROGRESS NOTES
"Pulmonology Consultation      Alaina Fernandez MRN# [id number]   YOB: 1955 Age: 63 year old   Date of Admission: 9/24/2018     Reason for consult: I was asked by Tammy to evaluate this patient for discharge planning.           Assessment and Plan:   Diabetic woman who developed fever, malaise, weakenss,  cough, sputum and mild patchy infiltrates after an overseas vacation has improved on rocephin and zmax but had a low grade temp last night once in past 24h and was observed to desat with sleep multiple times so was put back on oxygen last night. She does not need it while awake today. She was planning on discharge today but has continued wheezy. Dr Munoz asked for another pulmonary consultation to discuss discharge planning. She had a \"normal\" wbc of 4.5 at admission.     Bilateral patchy pneumonia best seen on CT scan with fever, productive cough and malaise and bronchospasm.   Still bronchospastic on exam despite po steroids.       Acute asthmatic bronchitis in the setting of a cap;     Acute community acquired pneumonia with fever and low wbc.     Fever    Probable STEFFEN; apneas, desats, snoring, fatigue    Suggest:     Increased duonebs  Increased steroids  Continue abx  Mucinex and flutter valve  CXR in am  Repeat CT in 2 weeks   Home tomorrow if she is better; call if questions any time  Recheck wbc  Broaden antibiotics with levaquin instead of Ceph if she stays febrile.   Appt in office after CT scan to do PFTS and review CT results with Dr Loera.     KRGromerMD    Also, here are recommendations by Dr Loera from earlier this week:     \"  Imaging, and presentation consistent with infectious pneumonia  Patient has shown dramatic improvement since admission currently on ceftriaxone and azithromycin  Complete 7 days of antibiotics transition to oral antibiotics on discharge  Follow-up in 2 weeks with primary care provider  Repeat CT chest in 6-8 weeks to assess resolution of " "infiltrates.  Should undergo polysomnography due to complaints of snoring and witnessed apneas concerning for sleep disordered breathing   Supplemental oxygen no longer needed as patient is on room air  Physical activity as tolerated  Arrange pulmonary/ Sleep follow up [phone]  , please call if questions\"         Chief Complaint:   wheeze    History is obtained from the patient and electronic health record         History of Present Illness:   This patient is a 63 year old female who presents with improvement after treatment for fever, cough, sputum, infiltrates but is not back to baseline. Clinical STEFFEN observed during this stay. She is hypertensive and hyperglycemic throughout the stay. She is bronchospastic and still fairly tight despite some steroids. She feels weak and not ready to go home.   She reports she was on a cruise [place]. Her  also got sick. He smokes but she does not. She asked for another Pulmonary consultation because she was still feeling \"sick and weak\" but reports the \"wheezing\" and dyspnea and cough are improving. She wants to know \"If I'm on the right antibiotics\" because she is worried she might have \"Legionnaire's\" but there have been no other cases reported so far as she knows. She still feels tight and a bit weak but overall better, just not back to normal yet. She has not previously had asthma or wheezing. Albuterol made her feel wired but Duonebs have helped but she complains they are not coming often enough.   We are asked to comment on discharge planning in light of events last night and complaints today.                 Past Medical History:     Past Medical History:   Diagnosis Date     Diabetes (H)              Past Surgical History:   History reviewed. No pertinent surgical history.            Social History:     Social History   Substance Use Topics     Smoking status: Never Smoker     Smokeless tobacco: Never Used     Alcohol use Yes      Comment: \"4 nights a week\"      "        Family History:   No family history on file.          Immunizations:     Immunization History   Administered Date(s) Administered     Influenza Quad, Recombinant, p-free (RIV4) 09/25/2018             Allergies:     Allergies   Allergen Reactions     Sulfa Drugs              Medications:     Current Facility-Administered Medications Ordered in Epic   Medication Dose Route Frequency Last Rate Last Dose     acetaminophen (TYLENOL) Suppository 650 mg  650 mg Rectal Q4H PRN         acetaminophen (TYLENOL) tablet 650 mg  650 mg Oral Q4H PRN   650 mg at 09/27/18 1139     atorvastatin (LIPITOR) tablet 40 mg  40 mg Oral Daily   40 mg at 09/27/18 0823     azithromycin (ZITHROMAX) tablet 250 mg  250 mg Oral Daily   250 mg at 09/27/18 0823     benzocaine-menthol (CHLORASEPTIC) 6-10 MG lozenge 1 lozenge  1 lozenge Buccal Q1H PRN   1 lozenge at 09/27/18 1531     benzonatate (TESSALON) capsule 100 mg  100 mg Oral TID PRN         bisacodyl (DULCOLAX) Suppository 10 mg  10 mg Rectal Daily PRN         cefTRIAXone (ROCEPHIN) 2 g vial to attach to  ml bag for ADULTS or NS 50 ml bag for PEDS  2 g Intravenous Q24H 200 mL/hr at 09/27/18 0819 2 g at 09/27/18 0819     citalopram (celeXA) tablet 40 mg  40 mg Oral Daily   40 mg at 09/27/18 0823     glucose gel 15-30 g  15-30 g Oral Q15 Min PRN        Or     dextrose 50 % injection 25-50 mL  25-50 mL Intravenous Q15 Min PRN        Or     glucagon injection 1 mg  1 mg Subcutaneous Q15 Min PRN         guaiFENesin-dextromethorphan (ROBITUSSIN DM) 100-10 MG/5ML syrup 5 mL  5 mL Oral Q4H PRN   5 mL at 09/27/18 1530     HOLD: All Oral Medications   Does not apply HOLD         insulin aspart (NovoLOG) inj (RAPID ACTING)  1-7 Units Subcutaneous TID AC   1 Units at 09/27/18 1316     insulin aspart (NovoLOG) inj (RAPID ACTING)  1-5 Units Subcutaneous At Bedtime         ipratropium - albuterol 0.5 mg/2.5 mg/3 mL (DUONEB) neb solution 3 mL  3 mL Nebulization Q4H PRN   3 mL at 09/27/18 0830      levothyroxine (SYNTHROID/LEVOTHROID) tablet 137 mcg  137 mcg Oral QAM AC   137 mcg at 09/27/18 0626     lidocaine (LMX4) cream   Topical Q1H PRN         lidocaine 1 % 1 mL  1 mL Other Q1H PRN         LORazepam (ATIVAN) injection 0.5-1 mg  0.5-1 mg Intravenous Q4H PRN   0.5 mg at 09/26/18 0220     magnesium sulfate 4 g in 100 mL sterile water (premade)  4 g Intravenous Q4H PRN         melatonin tablet 1 mg  1 mg Oral At Bedtime PRN         metoprolol succinate (TOPROL-XL) 24 hr tablet 25 mg  25 mg Oral Daily   25 mg at 09/27/18 0823     naloxone (NARCAN) injection 0.1-0.4 mg  0.1-0.4 mg Intravenous Q2 Min PRN         omeprazole (priLOSEC) CR capsule 20 mg  20 mg Oral Daily   20 mg at 09/27/18 0823     ondansetron (ZOFRAN-ODT) ODT tab 4 mg  4 mg Oral Q6H PRN        Or     ondansetron (ZOFRAN) injection 4 mg  4 mg Intravenous Q6H PRN         oxybutynin (DITROPAN-XL) 24 hr tablet 10 mg  10 mg Oral Daily   10 mg at 09/27/18 0822     polyethylene glycol (MIRALAX/GLYCOLAX) Packet 17 g  17 g Oral Daily PRN         potassium chloride (KLOR-CON) Packet 20-40 mEq  20-40 mEq Oral or Feeding Tube Q2H PRN         potassium chloride 10 mEq in 100 mL intermittent infusion with 10 mg lidocaine  10 mEq Intravenous Q1H PRN         potassium chloride 10 mEq in 100 mL sterile water intermittent infusion (premix)  10 mEq Intravenous Q1H PRN         potassium chloride 20 mEq in 50 mL intermittent infusion  20 mEq Intravenous Q1H PRN         potassium chloride SA (K-DUR/KLOR-CON M) CR tablet 20-40 mEq  20-40 mEq Oral Q2H PRN         predniSONE (DELTASONE) tablet 40 mg  40 mg Oral Daily   40 mg at 09/27/18 0823     senna-docusate (SENOKOT-S;PERICOLACE) 8.6-50 MG per tablet 1 tablet  1 tablet Oral BID PRN        Or     senna-docusate (SENOKOT-S;PERICOLACE) 8.6-50 MG per tablet 2 tablet  2 tablet Oral BID PRN   2 tablet at 09/27/18 1535     sodium chloride (PF) 0.9% PF flush 3 mL  3 mL Intracatheter Q1H PRN   3 mL at 09/26/18 1001      sodium chloride (PF) 0.9% PF flush 3 mL  3 mL Intracatheter Q8H   3 mL at 09/27/18 0824     No current [facility]-ordered outpatient prescriptions on file.             Review of Systems:   The 5 point Review of Systems is negative other than noted in the HPI            Physical Exam:     Vitals were reviewed  Patient Vitals for the past 24 hrs:   BP Temp Temp src Pulse Heart Rate Resp SpO2 Weight   09/27/18 1531 145/70 98.1  F (36.7  C) Oral 85 82 24 - -   09/27/18 1116 - 97.9  F (36.6  C) Axillary - - - - -   09/27/18 1113 148/67 100.4  F (38  C) Oral 79 79 24 93 % -   09/27/18 0835 - - - - 88 21 99 % -   09/27/18 0737 139/77 99.3  F (37.4  C) Oral 84 80 20 96 % -   09/27/18 0620 - - - - - - - 100.9 kg (222 lb 7.1 oz)   09/27/18 0540 - - - - - 20 - -   09/27/18 0420 - - - - 69 18 96 % -   09/27/18 0241 - - - - 65 18 97 % -   09/27/18 0025 149/74 98.2  F (36.8  C) Oral - 72 20 98 % -   09/26/18 1917 147/73 98.2  F (36.8  C) Oral 86 79 18 94 % -     Pleasant obese lady sitting upright on bed nad ox3  Diffuse expiratory wheeze nearly silent  Few rhonchi  Flushed and diaphoretic.   ox3  nad but worried  Chatting on phone when I left, full sentences and not coughing.        Data:     Lab Results   Component Value Date    WBC 4.5 09/25/2018    WBC 10.3 09/24/2018    HGB 10.6 (L) 09/25/2018    HGB 11.8 09/24/2018    HCT 31.6 (L) 09/25/2018    HCT 35.0 09/24/2018    PLT 78 (L) 09/25/2018     09/24/2018     09/25/2018     09/24/2018    POTASSIUM 3.9 09/25/2018    POTASSIUM 3.0 (L) 09/24/2018    CHLORIDE 112 (H) 09/25/2018    CHLORIDE 103 09/24/2018    CO2 26 09/25/2018    CO2 24 09/24/2018    BUN 9 09/25/2018    BUN 10 09/24/2018    CR 0.62 09/25/2018    CR 0.65 09/24/2018     (H) 09/25/2018     (H) 09/24/2018    DD 2.2 (H) 09/24/2018    NTBNPI 314 09/24/2018    TROPI <0.015 09/24/2018    AST 18 09/25/2018    AST 18 09/24/2018    ALT 26 09/25/2018    ALT 29 09/24/2018    ALKPHOS 84 09/25/2018     ALKPHOS 101 09/24/2018    BILITOTAL 0.5 09/25/2018    BILITOTAL 1.6 (H) 09/24/2018     Study Result      CHEST TWO VIEWS  9/24/2018 7:20 PM      HISTORY: Check for pneumonia, short of breath, fever, chest pain with  cough, crackles left lung.      COMPARISON: None.         IMPRESSION: Small infiltrate in the medial segment of the right middle  lobe consistent with pneumonia. Otherwise normal.     SLOANE GOVEA MD     CT CHEST WITH CONTRAST  9/24/2018 9:04 PM     HISTORY: Shortness of breath, cough, chest pain, and elevated D-dimer  with recent travel. Evaluate for pulmonary embolism and pneumonia.     COMPARISON: Radiographs earlier today.     TECHNIQUE: Routine transverse CT imaging of the chest was performed  following the uneventful intravenous administration of 77mL Isovue-370  contrast. A pulmonary embolism protocol was utilized. Radiation dose  for this scan was reduced using automated exposure control, adjustment  of the mA and/or kV according to patient size, or iterative  reconstruction technique.     FINDINGS: The heart size is normal. There are a few nonenlarged  mediastinal lymph nodes with no enlarged lymph node or other abnormal  mass. The thoracic aorta is unremarkable. There is very good  opacification of the pulmonary arteries with contrast. No pulmonary  embolism is seen. There are a few scattered faint infiltrates or  atelectasis throughout both lungs with no extensive pulmonary  consolidation seen. No abnormal mass is identified. No pneumothorax is  demonstrated. No pleural effusion is identified. There are  degenerative changes in the spine. No other osseous abnormality is  seen. No chest wall pathology is seen. The visualized upper abdomen is  unremarkable.         IMPRESSION:   1. No pulmonary embolism is seen.  2. Small areas of increased density throughout both lungs consistent  with small infiltrates or atelectasis. No distinct mass is seen.     FABIANO ORTIZ MD  VENOUS ULTRASOUND  BOTH LEGS  9/25/2018 9:16 AM      HISTORY: Rule out DVT, elevated d dimer;      COMPARISON: None.     FINDINGS:  Examination of the deep veins with graded compression and  color flow Doppler with spectral wave form analysis was performed.   There is no evidence for DVT. There are bilateral Baker's cyst.  Baker's cyst on the right measures 2.7 x 1.5 x 0.8 cm. Baker's cyst on  the left measures 4.6 x 2.5 x 1.5 cm.         IMPRESSION: No evidence of deep venous thrombosis.     FREDDY MULLIGAN MD              All imaging studies reviewed by me.

## 2018-09-27 NOTE — PLAN OF CARE
Problem: Patient Care Overview  Goal: Plan of Care/Patient Progress Review  Outcome: Improving  Admitted for Chest pain, fever, chills, and SOB. Patient is being treated for bilateral pneumonia. VS wdl on RA. A/Ox4. Patient ulices pain all shift. Up independently in room and halls.  tolerating reg diet. Blood sugars 199/. +gas, -bm. Voiding to toilette. LS diminished throughout, course in lower lobes, stiderous on inspiration in upper lobes. Patient complained of some breathing difficulties, PRN Duoneb given. IS to 1000.

## 2018-09-27 NOTE — PROGRESS NOTES
[facility]    Hospitalist Progress Note    Assessment & Plan   Alaina Fernandez is a 63 year old female who presents with shortness of breath.  Admitted for further evaluation and treatment.     Acute hypoxic respiratory failure due to community acquired pneumonia:   -Patient with normal lactic acid, basic natruretic peptide, and troponin on admission.    -CT Chest with contrast with no pulmonary embolism, small areas of increased density throughout both lungs consistent with small infiltrates or atelectasis reported.  Patient vital signs on admission included temperature 100.7, heart rate of 116, respirations at 17   -initially placed on BIPAP as she was hypoxic; tachypneic and wheezy  -Legionella antigen- negative  -continue Rocephin and azithromycin  -Patient was weaned off oxygen on 9/25 however overnight had a coughing spell and again was put back on BiPAP; now weaned off BiPAP  -continues on 2 L nasal cannula  -Still quite wheezy this morning, and feels poorly, continue prednisone 40 mg daily  -Robitussin and Tessalon for symptomatic treatment of cough  -Mobilize; IS  -Appreciate pulmonary consult; will request pulmonary to revisit her today.     Hyperlipidemia:  -resume atorvastatin at dc.     Diabetes Mellitus type 2:   -hold PTA metformin.  -SSI     Hypokalemia:   -Patient with potassium level of 3.0 on admission.  -replacement protocol     Elevated total bilirubin:   -Patient with bilirubin at 1.6 on admission.  -improved     Hypothyroidism:  -Continue prior to admission levothyroxine.    # Pain Assessment:  Current Pain Score 9/27/2018   Patient currently in pain? denies   Pain score (0-10) -   Alaina apple pain level was assessed and she currently denies pain.        D/W: RN  DVT Prophylaxis: Pneumatic Compression Devices  Code Status: Full Code    Disposition: Expected discharge 9/28 if respiratory status improved    Moises Epperson MD    Interval History   Very wheezy, short of breath  weak and tired overall.  Feels poorly this morning.  Continues to be stable on 2 L oxygen.  Cough with minimal clear sputum production.  Afebrile.    -Data reviewed today: I reviewed all new labs and imaging results over the last 24 hours. I personally reviewed no images or EKG's today.      Physical Exam   Temp: 99.3  F (37.4  C) Temp src: Oral BP: 139/77 Pulse: 84 Heart Rate: 88 Resp: 21 SpO2: 99 % (post-neb) O2 Device: Nasal cannula Oxygen Delivery: 2 LPM  Vitals:    09/25/18 0600 09/26/18 0656 09/27/18 0620   Weight: 101.1 kg (222 lb 14.2 oz) 100.5 kg (221 lb 9 oz) 100.9 kg (222 lb 7.1 oz)     Vital Signs with Ranges  Temp:  [98.2  F (36.8  C)-99.4  F (37.4  C)] 99.3  F (37.4  C)  Pulse:  [77-86] 84  Heart Rate:  [65-91] 88  Resp:  [18-21] 21  BP: (130-154)/(73-77) 139/77  SpO2:  [94 %-99 %] 99 %  I/O last 3 completed shifts:  In: 555 [P.O.:555]  Out: -     Constitutional: AAOX3, NAD  HEENT: No pallor or icterus  Neck- Supple, Good ROM, No JVD  Respiratory: Bilateral extensive expiratory wheezes, Normal WOB  Cardiovascular: RRR, No murmur  GI: Soft, Non- tender, BS- normoactive  Skin/Integument: Warm and dry, no rashes  MSK: No joint deformity or swelling, no edema  Neuro: CN- grossly intact      Medications           atorvastatin  40 mg Oral Daily     azithromycin  250 mg Oral Daily     cefTRIAXone  2 g Intravenous Q24H     citalopram  40 mg Oral Daily     insulin aspart  1-7 Units Subcutaneous TID AC     insulin aspart  1-5 Units Subcutaneous At Bedtime     levothyroxine  137 mcg Oral QAM AC     metoprolol succinate  25 mg Oral Daily     omeprazole  20 mg Oral Daily     oxybutynin  10 mg Oral Daily     predniSONE  40 mg Oral Daily     sodium chloride (PF)  3 mL Intracatheter Q8H       Data       Recent Labs  Lab 09/25/18  0535 09/24/18  1845   WBC 4.5 10.3   HGB 10.6* 11.8   MCV 93 92   PLT 78* 232    138   POTASSIUM 3.9 3.0*   CHLORIDE 112* 103   CO2 26 24   BUN 9 10   CR 0.62 0.65   ANIONGAP 5 11   JENNYFER  8.2* 8.7   * 188*   ALBUMIN 2.8* 3.4   PROTTOTAL 7.0 7.9   BILITOTAL 0.5 1.6*   ALKPHOS 84 101   ALT 26 29   AST 18 18   TROPI  --  <0.015       No results found for this or any previous visit (from the past 24 hour(s)).

## 2018-09-27 NOTE — PLAN OF CARE
Problem: Breathing Pattern Ineffective (Adult)  Goal: Identify Related Risk Factors and Signs and Symptoms  Related risk factors and signs and symptoms are identified upon initiation of Human Response Clinical Practice Guideline (CPG).   Outcome: No Change  Pt has been up in room more and in chair temp max 100.4 tylenol prn given expiratory wheezes noted this morning and prn nebulizer given. Pt states she is feeling better today but weak. Voiding dry non productive cough remains on O2 2 liters per n/c

## 2018-09-27 NOTE — PLAN OF CARE
"Problem: Patient Care Overview  Goal: Plan of Care/Patient Progress Review  Outcome: No Change  VSS, currently 94% on 1L continue to wean, possible STEFFEN, bowels active, +flatus, no BM for \"a couple days\" senna given, pt would like prn mirilax tomorrow, lungs diminished, ambulating SBA, voiding adequate amounts, coughing fit this evening, prn nebs given, covering blood sugars appropriately, possible discharge tomorrow pending progress.       "

## 2018-09-27 NOTE — PROVIDER NOTIFICATION
Call placed to Dr. Epperson regarding pulmonary consult and he states to this nurse he called [facility] this morning that pt is to be seen and wanted me to call the clinic again to make sure pt seen. I called the clinic and they will page a Dr. Gaming to see the patient and took Dr. Epperson pager number.for him to call.  Pt informed of above and next nurse aware.

## 2018-09-28 ENCOUNTER — APPOINTMENT (OUTPATIENT)
Dept: GENERAL RADIOLOGY | Facility: CLINIC | Age: 63
DRG: 193 | End: 2018-09-28
Attending: INTERNAL MEDICINE
Payer: COMMERCIAL

## 2018-09-28 LAB
GLUCOSE BLDC GLUCOMTR-MCNC: 182 MG/DL (ref 70–99)
GLUCOSE BLDC GLUCOMTR-MCNC: 220 MG/DL (ref 70–99)
GLUCOSE BLDC GLUCOMTR-MCNC: 260 MG/DL (ref 70–99)
GLUCOSE BLDC GLUCOMTR-MCNC: 263 MG/DL (ref 70–99)
WBC # BLD AUTO: 6.2 10E9/L (ref 4–11)

## 2018-09-28 PROCEDURE — 40000275 ZZH STATISTIC RCP TIME EA 10 MIN

## 2018-09-28 PROCEDURE — 25000132 ZZH RX MED GY IP 250 OP 250 PS 637: Performed by: INTERNAL MEDICINE

## 2018-09-28 PROCEDURE — 00000146 ZZHCL STATISTIC GLUCOSE BY METER IP

## 2018-09-28 PROCEDURE — 25000128 H RX IP 250 OP 636: Performed by: INTERNAL MEDICINE

## 2018-09-28 PROCEDURE — 94640 AIRWAY INHALATION TREATMENT: CPT | Mod: 76

## 2018-09-28 PROCEDURE — 94640 AIRWAY INHALATION TREATMENT: CPT

## 2018-09-28 PROCEDURE — 12000007 ZZH R&B INTERMEDIATE

## 2018-09-28 PROCEDURE — 85048 AUTOMATED LEUKOCYTE COUNT: CPT | Performed by: INTERNAL MEDICINE

## 2018-09-28 PROCEDURE — 99232 SBSQ HOSP IP/OBS MODERATE 35: CPT | Performed by: INTERNAL MEDICINE

## 2018-09-28 PROCEDURE — 25000132 ZZH RX MED GY IP 250 OP 250 PS 637: Performed by: HOSPITALIST

## 2018-09-28 PROCEDURE — 36415 COLL VENOUS BLD VENIPUNCTURE: CPT | Performed by: INTERNAL MEDICINE

## 2018-09-28 PROCEDURE — 25000125 ZZHC RX 250: Performed by: INTERNAL MEDICINE

## 2018-09-28 PROCEDURE — 71046 X-RAY EXAM CHEST 2 VIEWS: CPT

## 2018-09-28 PROCEDURE — 25000132 ZZH RX MED GY IP 250 OP 250 PS 637

## 2018-09-28 RX ORDER — PREDNISONE 20 MG/1
60 TABLET ORAL DAILY
Status: DISCONTINUED | OUTPATIENT
Start: 2018-09-29 | End: 2018-09-29 | Stop reason: HOSPADM

## 2018-09-28 RX ORDER — METHYLPREDNISOLONE SODIUM SUCCINATE 125 MG/2ML
60 INJECTION, POWDER, LYOPHILIZED, FOR SOLUTION INTRAMUSCULAR; INTRAVENOUS EVERY 8 HOURS
Status: COMPLETED | OUTPATIENT
Start: 2018-09-28 | End: 2018-09-28

## 2018-09-28 RX ORDER — LEVOFLOXACIN 750 MG/1
750 TABLET, FILM COATED ORAL DAILY
Status: DISCONTINUED | OUTPATIENT
Start: 2018-09-28 | End: 2018-09-29 | Stop reason: HOSPADM

## 2018-09-28 RX ADMIN — IPRATROPIUM BROMIDE AND ALBUTEROL SULFATE 3 ML: 2.5; .5 SOLUTION RESPIRATORY (INHALATION) at 02:21

## 2018-09-28 RX ADMIN — IPRATROPIUM BROMIDE AND ALBUTEROL SULFATE 3 ML: 2.5; .5 SOLUTION RESPIRATORY (INHALATION) at 20:43

## 2018-09-28 RX ADMIN — INSULIN ASPART 3 UNITS: 100 INJECTION, SOLUTION INTRAVENOUS; SUBCUTANEOUS at 11:52

## 2018-09-28 RX ADMIN — LEVOTHYROXINE SODIUM 137 MCG: 137 TABLET ORAL at 06:47

## 2018-09-28 RX ADMIN — GUAIFENESIN AND DEXTROMETHORPHAN 5 ML: 100; 10 SYRUP ORAL at 06:47

## 2018-09-28 RX ADMIN — ATORVASTATIN CALCIUM 40 MG: 40 TABLET, FILM COATED ORAL at 09:54

## 2018-09-28 RX ADMIN — METHYLPREDNISOLONE SODIUM SUCCINATE 62.5 MG: 125 INJECTION, POWDER, FOR SOLUTION INTRAMUSCULAR; INTRAVENOUS at 09:55

## 2018-09-28 RX ADMIN — INSULIN ASPART 2 UNITS: 100 INJECTION, SOLUTION INTRAVENOUS; SUBCUTANEOUS at 17:52

## 2018-09-28 RX ADMIN — GUAIFENESIN 600 MG: 600 TABLET, EXTENDED RELEASE ORAL at 09:54

## 2018-09-28 RX ADMIN — INSULIN ASPART 1 UNITS: 100 INJECTION, SOLUTION INTRAVENOUS; SUBCUTANEOUS at 09:55

## 2018-09-28 RX ADMIN — GUAIFENESIN 600 MG: 600 TABLET, EXTENDED RELEASE ORAL at 22:03

## 2018-09-28 RX ADMIN — Medication 1 LOZENGE: at 11:48

## 2018-09-28 RX ADMIN — OMEPRAZOLE 20 MG: 20 CAPSULE, DELAYED RELEASE ORAL at 09:54

## 2018-09-28 RX ADMIN — METHYLPREDNISOLONE SODIUM SUCCINATE 62.5 MG: 125 INJECTION, POWDER, FOR SOLUTION INTRAMUSCULAR; INTRAVENOUS at 16:47

## 2018-09-28 RX ADMIN — METOPROLOL SUCCINATE 25 MG: 25 TABLET, EXTENDED RELEASE ORAL at 09:54

## 2018-09-28 RX ADMIN — IPRATROPIUM BROMIDE AND ALBUTEROL SULFATE 3 ML: 2.5; .5 SOLUTION RESPIRATORY (INHALATION) at 16:14

## 2018-09-28 RX ADMIN — GUAIFENESIN AND DEXTROMETHORPHAN 5 ML: 100; 10 SYRUP ORAL at 19:18

## 2018-09-28 RX ADMIN — GUAIFENESIN AND DEXTROMETHORPHAN 5 ML: 100; 10 SYRUP ORAL at 00:56

## 2018-09-28 RX ADMIN — METHYLPREDNISOLONE SODIUM SUCCINATE 62.5 MG: 125 INJECTION, POWDER, FOR SOLUTION INTRAMUSCULAR; INTRAVENOUS at 00:50

## 2018-09-28 RX ADMIN — OXYBUTYNIN CHLORIDE 10 MG: 10 TABLET, EXTENDED RELEASE ORAL at 09:54

## 2018-09-28 RX ADMIN — GUAIFENESIN AND DEXTROMETHORPHAN 5 ML: 100; 10 SYRUP ORAL at 11:48

## 2018-09-28 RX ADMIN — IPRATROPIUM BROMIDE AND ALBUTEROL SULFATE 3 ML: 2.5; .5 SOLUTION RESPIRATORY (INHALATION) at 07:55

## 2018-09-28 RX ADMIN — LEVOFLOXACIN 750 MG: 750 TABLET, FILM COATED ORAL at 11:48

## 2018-09-28 ASSESSMENT — ACTIVITIES OF DAILY LIVING (ADL)
ADLS_ACUITY_SCORE: 11

## 2018-09-28 NOTE — PLAN OF CARE
Problem: Patient Care Overview  Goal: Plan of Care/Patient Progress Review  Outcome: Improving  Up SBA. A/o4. VSS 1L 92-95%, w/possible STEFFEN. Neb tx given x2. LS dim, crackles/wheezing in lowers. Mucinex and PRN Robitussin given w/relief.  BS active, no BM, passing gas. Requesting miralax for today. Reg diet. Voiding adequately. No insulin coverage needed overnight. Plan for possible discharge to home 9/28.

## 2018-09-28 NOTE — PLAN OF CARE
Problem: Patient Care Overview  Goal: Plan of Care/Patient Progress Review  Outcome: Improving  A&O x4. VSS on room air, tele NSR. Can be tachy when coughing in the 100s. BS+, BM-, flatus+. Voiding adequately, tolerating regular diet. Sliding scale Insulin given x2. Lungs diminised w/fine crackles in bases. Robitussin and losenge given x1, helped with coughing. PRN neb x1. Skin intact. Denies pain. Up independently.     Plan to go home tomorrow.

## 2018-09-28 NOTE — PROGRESS NOTES
I called [facility] to schedule pt's Pulmonology appt within 1-2 weeks.  Per  Dr Loera or Dr Sheppard have no available appts in 1-2 weeks.  The first available appointment is on October 22nd with Dr Loera at 1:45 PM in [place] (Address: [address] [address], [facility]).  I also left a message to the CT scan  to schedule pt's CT scan before her appt wt Luigi Loera.

## 2018-09-28 NOTE — PROGRESS NOTES
SPIRITUAL HEALTH SERVICES Progress Note  [facility] 34    Initial visit per LOS.  affirmed the pt's strong sense of efrain which support her in her illness while abroad and gave her the strength to get to [facility].  Pt shared love of traveling - especially cruising - with .  No plan to follow, as pt is expected to be discharged soon.      Di Medina   Intern

## 2018-09-28 NOTE — PROGRESS NOTES
Patient has been assessed for Home Oxygen needs. Oxygen readings:     *Pulse oximetry (SpO2) = 92-95%% on room air at rest while awake.     *SpO2 improved to 96% on 1liters/minute at rest.     *SpO2 = 94% on room air during activity/with exercise.     *SpO2 improved to 96% on 1liters/minute during activity/with exercise.

## 2018-09-28 NOTE — PROVIDER NOTIFICATION
"Zeenat paged:    \"Patient assessed for home oxygen needs. Stayed above 89% when walking on room air.\"    "

## 2018-09-28 NOTE — PROGRESS NOTES
[facility]    Hospitalist Progress Note    Assessment & Plan   Alaina Fernandez is a 63 year old female who presents with shortness of breath.  Admitted for further evaluation and treatment.     Acute hypoxic respiratory failure due to community acquired pneumonia:   -Patient with normal lactic acid, basic natruretic peptide, and troponin on admission.    -CT Chest with contrast with no pulmonary embolism, small areas of increased density throughout both lungs consistent with small infiltrates or atelectasis reported.   -initially placed on BIPAP as she was hypoxic; tachypneic and wheezy  -Legionella antigen- negative  -Initially on Rocephin and azithromycin  -initially on BiPAP; now weaned off O2 this afternoon  -Appreciate pulmonary inputs  -Pneumonia with significant bronchospastic component  -Patient was febrile as of last night, with a fever of 100.4, as per pulmonary recommendation I will change antibiotics to Levaquin.  Discontinue ceftriaxone and azithromycin.  -If doing well anticipate discharge on 5 more days of Levaquin.  -Continue IV Solu-Medrol 60 mg 3 times daily through till end of the day today, transition to prednisone 60 mg p.o. daily starting tomorrow morning.  Plan for a 2-week taper.  -Suggest albuterol inhaler at discharge  -Robitussin and Tessalon for symptomatic treatment of cough  -Mobilize; IS  -Outpatient follow-up with pulmonary in 1-2 weeks  -Repeat CT in 2 weeks per pulmonary.     Hyperlipidemia:  -resume atorvastatin at dc.     Diabetes Mellitus type 2:   -hold PTA metformin.  -SSI     Hypokalemia:   -Patient with potassium level of 3.0 on admission.  -replacement protocol     Elevated total bilirubin:   -Patient with bilirubin at 1.6 on admission.  -improved     Hypothyroidism:  -Continue prior to admission levothyroxine.    # Pain Assessment:  Current Pain Score 9/28/2018   Patient currently in pain? denies   Pain score (0-10) -   Alaina apple pain level was assessed  and she currently denies pain.        D/W: RN  DVT Prophylaxis: Pneumatic Compression Devices  Code Status: Full Code    Disposition: Expected discharge 9/29    Moises Epperson MD    Interval History   Not as wheezy as yesterday, dyspnea improving.  Still gets a little short winded with activity.  Temperature of 100.4 last night at 11 PM.  Weaned off oxygen this afternoon.    -Data reviewed today: I reviewed all new labs and imaging results over the last 24 hours. I personally reviewed no images or EKG's today.      Physical Exam   Temp: 99.2  F (37.3  C) Temp src: Oral BP: 142/59 Pulse: 70 Heart Rate: 76 Resp: 18 SpO2: 90 % O2 Device: None (Room air) Oxygen Delivery: 2 LPM  Vitals:    09/25/18 0600 09/26/18 0656 09/27/18 0620   Weight: 101.1 kg (222 lb 14.2 oz) 100.5 kg (221 lb 9 oz) 100.9 kg (222 lb 7.1 oz)     Vital Signs with Ranges  Temp:  [98.1  F (36.7  C)-99.2  F (37.3  C)] 99.2  F (37.3  C)  Pulse:  [70-85] 70  Heart Rate:  [76-82] 76  Resp:  [18-24] 18  BP: (130-149)/(55-70) 142/59  SpO2:  [90 %-95 %] 90 %  I/O last 3 completed shifts:  In: 300 [P.O.:300]  Out: -     Constitutional: AAOX3, NAD  Neck- Supple, Good ROM, No JVD  Respiratory: No active wheezing today, Normal WOB  Cardiovascular: RRR, No murmur  GI: Soft, Non- tender, BS- normoactive  Skin/Integument: Warm and dry, no rashes  MSK: No joint deformity or swelling, no edema  Neuro: CN- grossly intact      Medications           atorvastatin  40 mg Oral Daily     guaiFENesin  600 mg Oral BID     insulin aspart  1-7 Units Subcutaneous TID AC     insulin aspart  1-5 Units Subcutaneous At Bedtime     levofloxacin  750 mg Oral Daily     levothyroxine  137 mcg Oral QAM AC     methylPREDNISolone  62.5 mg Intravenous Q8H     metoprolol succinate  25 mg Oral Daily     omeprazole  20 mg Oral Daily     oxybutynin  10 mg Oral Daily     sodium chloride (PF)  3 mL Intracatheter Q8H       Data       Recent Labs  Lab 09/28/18  0807 09/25/18  0535 09/24/18  1847  "  WBC 6.2 4.5 10.3   HGB  --  10.6* 11.8   MCV  --  93 92   PLT  --  78* 232   NA  --  143 138   POTASSIUM  --  3.9 3.0*   CHLORIDE  --  112* 103   CO2  --  26 24   BUN  --  9 10   CR  --  0.62 0.65   ANIONGAP  --  5 11   JENNYFER  --  8.2* 8.7   GLC  --  208* 188*   ALBUMIN  --  2.8* 3.4   PROTTOTAL  --  7.0 7.9   BILITOTAL  --  0.5 1.6*   ALKPHOS  --  84 101   ALT  --  26 29   AST  --  18 18   TROPI  --   --  <0.015       Recent Results (from the past 24 hour(s))   XR Chest 2 Views    Narrative    CHEST TWO VIEWS 9/28/2018 11:25 AM     HISTORY: Recent CAP with \"normal\" white blood count and patchy  infiltrates and fever; clinically better, comparison view.     COMPARISON: 9/24/2018.    FINDINGS: Mild interstitial prominence similar to prior. No  pneumothorax or pleural effusion. No airspace consolidation. Heart  size normal.       Impression    IMPRESSION: Mild prominent interstitial markings could be related to  atypical infection, pulmonary edema, or pneumonitis.     OSMAR COLLAZO MD     "

## 2018-09-29 VITALS
HEART RATE: 98 BPM | DIASTOLIC BLOOD PRESSURE: 78 MMHG | WEIGHT: 219 LBS | TEMPERATURE: 98.8 F | BODY MASS INDEX: 38.8 KG/M2 | SYSTOLIC BLOOD PRESSURE: 176 MMHG | RESPIRATION RATE: 18 BRPM | OXYGEN SATURATION: 99 % | HEIGHT: 63 IN

## 2018-09-29 PROBLEM — J18.9 PNEUMONIA: Status: ACTIVE | Noted: 2018-09-29

## 2018-09-29 PROBLEM — E87.6 HYPOKALEMIA: Status: ACTIVE | Noted: 2018-09-29

## 2018-09-29 LAB
GLUCOSE BLDC GLUCOMTR-MCNC: 121 MG/DL (ref 70–99)
GLUCOSE BLDC GLUCOMTR-MCNC: 171 MG/DL (ref 70–99)

## 2018-09-29 PROCEDURE — 40000275 ZZH STATISTIC RCP TIME EA 10 MIN

## 2018-09-29 PROCEDURE — 25000132 ZZH RX MED GY IP 250 OP 250 PS 637: Performed by: INTERNAL MEDICINE

## 2018-09-29 PROCEDURE — 99239 HOSP IP/OBS DSCHRG MGMT >30: CPT | Performed by: INTERNAL MEDICINE

## 2018-09-29 PROCEDURE — 25000125 ZZHC RX 250: Performed by: INTERNAL MEDICINE

## 2018-09-29 PROCEDURE — 94799 UNLISTED PULMONARY SVC/PX: CPT

## 2018-09-29 PROCEDURE — 25000132 ZZH RX MED GY IP 250 OP 250 PS 637

## 2018-09-29 PROCEDURE — 94640 AIRWAY INHALATION TREATMENT: CPT

## 2018-09-29 PROCEDURE — 00000146 ZZHCL STATISTIC GLUCOSE BY METER IP

## 2018-09-29 PROCEDURE — 94640 AIRWAY INHALATION TREATMENT: CPT | Mod: 76

## 2018-09-29 PROCEDURE — 25000132 ZZH RX MED GY IP 250 OP 250 PS 637: Performed by: HOSPITALIST

## 2018-09-29 RX ORDER — CITALOPRAM HYDROBROMIDE 40 MG/1
40 TABLET ORAL DAILY
Qty: 30 TABLET | Refills: 0
Start: 2018-10-06

## 2018-09-29 RX ORDER — GUAIFENESIN/DEXTROMETHORPHAN 100-10MG/5
10 SYRUP ORAL EVERY 4 HOURS PRN
Qty: 236 ML | Refills: 0 | Status: SHIPPED | OUTPATIENT
Start: 2018-09-29

## 2018-09-29 RX ORDER — PREDNISONE 20 MG/1
TABLET ORAL
Qty: 12 TABLET | Refills: 0 | Status: SHIPPED | OUTPATIENT
Start: 2018-09-29

## 2018-09-29 RX ORDER — ALBUTEROL SULFATE 90 UG/1
2 AEROSOL, METERED RESPIRATORY (INHALATION) EVERY 4 HOURS PRN
Qty: 1 INHALER | Refills: 1 | Status: SHIPPED | OUTPATIENT
Start: 2018-09-29

## 2018-09-29 RX ORDER — LEVOFLOXACIN 750 MG/1
750 TABLET, FILM COATED ORAL DAILY
Qty: 7 TABLET | Refills: 0 | Status: SHIPPED | OUTPATIENT
Start: 2018-09-30 | End: 2018-10-07

## 2018-09-29 RX ADMIN — IPRATROPIUM BROMIDE AND ALBUTEROL SULFATE 3 ML: 2.5; .5 SOLUTION RESPIRATORY (INHALATION) at 11:25

## 2018-09-29 RX ADMIN — GUAIFENESIN AND DEXTROMETHORPHAN 5 ML: 100; 10 SYRUP ORAL at 14:56

## 2018-09-29 RX ADMIN — GUAIFENESIN 600 MG: 600 TABLET, EXTENDED RELEASE ORAL at 09:37

## 2018-09-29 RX ADMIN — OMEPRAZOLE 20 MG: 20 CAPSULE, DELAYED RELEASE ORAL at 09:37

## 2018-09-29 RX ADMIN — LEVOTHYROXINE SODIUM 137 MCG: 137 TABLET ORAL at 06:54

## 2018-09-29 RX ADMIN — LEVOFLOXACIN 750 MG: 750 TABLET, FILM COATED ORAL at 09:37

## 2018-09-29 RX ADMIN — ATORVASTATIN CALCIUM 40 MG: 40 TABLET, FILM COATED ORAL at 09:37

## 2018-09-29 RX ADMIN — OXYBUTYNIN CHLORIDE 10 MG: 10 TABLET, EXTENDED RELEASE ORAL at 09:37

## 2018-09-29 RX ADMIN — INSULIN ASPART 1 UNITS: 100 INJECTION, SOLUTION INTRAVENOUS; SUBCUTANEOUS at 12:32

## 2018-09-29 RX ADMIN — IPRATROPIUM BROMIDE AND ALBUTEROL SULFATE 3 ML: 2.5; .5 SOLUTION RESPIRATORY (INHALATION) at 07:59

## 2018-09-29 RX ADMIN — PREDNISONE 60 MG: 20 TABLET ORAL at 09:37

## 2018-09-29 RX ADMIN — METOPROLOL SUCCINATE 25 MG: 25 TABLET, EXTENDED RELEASE ORAL at 09:37

## 2018-09-29 RX ADMIN — GUAIFENESIN AND DEXTROMETHORPHAN 5 ML: 100; 10 SYRUP ORAL at 09:37

## 2018-09-29 ASSESSMENT — ACTIVITIES OF DAILY LIVING (ADL)
ADLS_ACUITY_SCORE: 11

## 2018-09-29 NOTE — PLAN OF CARE
Problem: Patient Care Overview  Goal: Plan of Care/Patient Progress Review  Outcome: Improving  A&O x4. VSS on room air, tele NSR. Can be tachy when coughing in the 100s. BS+, flatus+. Voiding adequately, tolerating regular diet. LS diminised w/fine crackles in bases. Robitussin x1 helped with coughing. PRN neb x1. Skin intact. Denies pain. Up independently.

## 2018-09-29 NOTE — DISCHARGE SUMMARY
[facility]    Discharge Summary  Hospitalist    Date of Admission:  9/24/2018  Date of Discharge:  9/29/2018  Discharging Provider: Tyler Hsu MD    Discharge Diagnoses   Principal Problem:    Acute respiratory failure with hypoxia (H)  Active Problems:    Pneumonia    Hypokalemia      History of Present Illness   63 year old female who presents with shortness of breath and a CT scan with contrast was completed with no pulmonary embolism seen, small areas of increased density throughout both lungs consistent with small infiltrates or atelectasis reported.  Patient vital signs on admission included temperature 100.7, heart rate of 116, respirations at 17 on BiPAP, blood pressure 124/69, saturating 98% on 40% FiO2 BiPAP settings.  Patient states that she had just returned from a vacation for approximately 10 days traveling throughout [place] including [place] and many  cities.  Patient states that she has felt ill since landing on the plane today and reported to the emergency department with fever, cough, shortness of breath and feeling ill.  Patient endorses intermittent chest pain with coughing, denies abdominal pain or vomiting, however, does endorse fever. Initial WBC 10.3, potassium low at 3.0, and bilirubin total high at 1.6.      Hospital Course   Treated with IV fluids and IV antibiotics, and given persistent hypoxia and wheezing was treated with Duonebs and started on IV solumedrol.  Breathing gradually improved and was weaned off O2 with a sat 99% on room air at time of discharge.  Prednisone decreased to 60 mg by discharge and will discharge on 40 mg daily and taper off in 9 days.  Was seen by Pulmonary (Dr. Leonel Loera at [phone]), and initially was on Ceftriaxone and Azithromycin, and was switched to Levaquin 750 mg and will continue for 1 more week to complete a 10 day course.  He also suggested a follow up CT in 2 months to assure the infiltrates  clear, and also suggested a sleep study when pneumonia better given her obesity, snoring, and witnessed apnea episodes.  She was provided with as needed insulin while here, and she will resume Metformin at discharge, and aware the prednisone may cause an elevation in her blood sugars.   She will hold her Celexa 40 mg daily for 1 week until the Levaquin is completed given the potential for prolonged QTc taking both drugs.      Tyler Hsu MD  Pager: [phone]  Cell Phone:  [phone]       Significant Results and Procedures   As above    Pending Results   These results will be followed up by Dr. Hsu  Unresulted Labs Ordered in the Past 30 Days of this Admission     Date and Time Order Name Status Description    9/24/2018 1903 Blood culture Preliminary     9/24/2018 1903 Blood culture Preliminary     9/24/2018 1845 T4 free In process           Code Status   Full Code       Primary Care Physician   Carlita Beyer    Physical Exam   Temp: 98.8  F (37.1  C) Temp src: Oral BP: 176/78 Pulse: 98 Heart Rate: 82 Resp: 18 SpO2: 99 % O2 Device: None (Room air)    Vitals:    09/26/18 0656 09/27/18 0620 09/29/18 0700   Weight: 100.5 kg (221 lb 9 oz) 100.9 kg (222 lb 7.1 oz) 99.3 kg (219 lb)     Vital Signs with Ranges  Temp:  [97.6  F (36.4  C)-98.9  F (37.2  C)] 98.8  F (37.1  C)  Pulse:  [76-98] 98  Heart Rate:  [69-82] 82  Resp:  [18] 18  BP: (136-176)/(67-78) 176/78  SpO2:  [95 %-99 %] 99 %  I/O last 3 completed shifts:  In: 650 [P.O.:650]  Out: -     Exam on discharge:   Lungs clear    # Discharge Pain Plan:   - Patient currently has NO PAIN and is not being prescribed pain medications on discharge.      Discharge Disposition   Discharged to home  Condition at discharge: Good    Consultations This Hospital Stay   PULMONARY IP CONSULT    Time Spent on this Encounter   I spent a total of 35 minutes discharging this patient.     Discharge Orders     Follow-up and recommended labs and tests    You have an  appointment on October 22nd with Dr Loera (pulmonologist) at 1:45 PM in [place] (Address: [address] [address], [facility]).  Please call clinic at [phone] if you do not hear from them for your CT scan appointment that needs to be scheduled before you October 22nd appointment with Dr Loera.   Please call  [phone] to schedule your sleep consult.      Please check with your health insurance for provider coverage before your follow up appointments.     Reason for your hospital stay   Pneumonia     Follow-up and recommended labs and tests    Follow up with primary care provider, Carlita Beyer, as scheduled on Tues, 10/2/18 at 3:15 PM.     Activity   Your activity upon discharge: activity as tolerated     Discharge Instructions   Call Dr. Galaviz at Pager [phone] if questions, or Cell Phone [phone].     Full Code     Diet   Follow this diet upon discharge: Diabetic diet       Discharge Medications   Current Discharge Medication List      START taking these medications    Details   albuterol (PROAIR HFA) 108 (90 Base) MCG/ACT inhaler Inhale 2 puffs into the lungs every 4 hours as needed for shortness of breath / dyspnea or wheezing  Qty: 1 Inhaler, Refills: 1    Comments: With spacer  Associated Diagnoses: Pneumonia of both lungs due to infectious organism, unspecified part of lung      guaiFENesin-dextromethorphan (ROBITUSSIN DM) 100-10 MG/5ML syrup Take 10 mLs by mouth every 4 hours as needed for cough  Qty: 236 mL, Refills: 0    Associated Diagnoses: Pneumonia of both lungs due to infectious organism, unspecified part of lung      levofloxacin (LEVAQUIN) 750 MG tablet Take 1 tablet (750 mg) by mouth daily for 7 days  Qty: 7 tablet, Refills: 0    Associated Diagnoses: Pneumonia of both lungs due to infectious organism, unspecified part of lung      predniSONE (DELTASONE) 20 MG tablet 2 pills daily for 3 days, 1 pill daily for 3 days, then 1/2 pill daily for 3 days.  Qty: 12 tablet,  Refills: 0    Associated Diagnoses: Pneumonia of both lungs due to infectious organism, unspecified part of lung      Spacer/Aero Chamber Mouthpiece MISC 1 Device as needed (for use with albuterol inhaler)  Qty: 1 each, Refills: 0    Associated Diagnoses: Pneumonia of both lungs due to infectious organism, unspecified part of lung         CONTINUE these medications which have CHANGED    Details   citalopram (CELEXA) 40 MG tablet Take 1 tablet (40 mg) by mouth daily  Qty: 30 tablet, Refills: 0    Associated Diagnoses: Adjustment disorder with depressed mood         CONTINUE these medications which have NOT CHANGED    Details   atorvastatin (LIPITOR) 40 MG tablet Take 40 mg by mouth daily      levothyroxine (SYNTHROID/LEVOTHROID) 137 MCG tablet Take 137 mcg by mouth daily      metFORMIN (GLUCOPHAGE-XR) 500 MG 24 hr tablet Take 1,000 mg by mouth daily (with breakfast)      metoprolol succinate (TOPROL-XL) 25 MG 24 hr tablet Take 25 mg by mouth daily      omeprazole (PRILOSEC) 20 MG CR capsule Take 20 mg by mouth daily      oxybutynin (DITROPAN-XL) 10 MG 24 hr tablet Take 10 mg by mouth daily           Allergies   Allergies   Allergen Reactions     Sulfa Drugs      Data   Most Recent 3 CBC's:  Recent Labs   Lab Test  09/28/18   0807  09/25/18   0535  09/24/18   1845   WBC  6.2  4.5  10.3   HGB   --   10.6*  11.8   MCV   --   93  92   PLT   --   78*  232      Most Recent 3 BMP's:  Recent Labs   Lab Test  09/25/18   0535  09/24/18   1845   NA  143  138   POTASSIUM  3.9  3.0*   CHLORIDE  112*  103   CO2  26  24   BUN  9  10   CR  0.62  0.65   ANIONGAP  5  11   JENNYFER  8.2*  8.7   GLC  208*  188*     Most Recent 2 LFT's:  Recent Labs   Lab Test  09/25/18   0535  09/24/18   1845   AST  18  18   ALT  26  29   ALKPHOS  84  101   BILITOTAL  0.5  1.6*     Most Recent INR's and Anticoagulation Dosing History:  Anticoagulation Dose History     There is no flowsheet data to display.        Most Recent 3 Troponin's:  Recent Labs   Lab  Test  09/24/18 1845   TROPI  <0.015     Most Recent Cholesterol Panel:No lab results found.  Most Recent 6 Bacteria Isolates From Any Culture (See EPIC Reports for Culture Details):  Recent Labs   Lab Test  09/25/18   1020  09/24/18   1949  09/24/18 1845   CULT  Moderate growth  Normal britt    No growth after 5 days  No growth after 5 days     Most Recent TSH, T4 and A1c Labs:  Recent Labs   Lab Test  09/25/18   0535  09/24/18 1845   TSH   --   0.22*   T4  1.32  1.39   A1C   --   5.9*

## 2018-09-29 NOTE — PLAN OF CARE
Problem: Patient Care Overview  Goal: Plan of Care/Patient Progress Review  Outcome: Improving  Pt is A+OX4. VSS on RA. Tele NSR. Up independently in room. Did not require PRN nebs or medication overnight. Denies pain. LS clear but diminished. Tolerating regular diet. Plan for probable discharge today.

## 2018-09-29 NOTE — PLAN OF CARE
Problem: Patient Care Overview  Goal: Plan of Care/Patient Progress Review  Outcome: Improving  A&O x4. AVSS stable on room air. Lungs diminished with crackles. Encouraging use of IS. BS+, BM-, flatus+. Voiding adequately, tolerating regular diet. Up independently. Denies pain. PRN robitussin x2 for coughing.     Plan to discharge today.

## 2018-09-29 NOTE — PROGRESS NOTES
PT A & Ox4. Pt discharged to home by son. Pt discharge with all belongings and meds. All the pts questions were answered about discharge and meds.

## 2018-09-30 LAB
BACTERIA SPEC CULT: NO GROWTH
BACTERIA SPEC CULT: NO GROWTH
Lab: NORMAL
Lab: NORMAL
SPECIMEN SOURCE: NORMAL
SPECIMEN SOURCE: NORMAL

## 2019-02-15 ENCOUNTER — HEALTH MAINTENANCE LETTER (OUTPATIENT)
Age: 64
End: 2019-02-15

## 2021-08-25 PROCEDURE — 88305 TISSUE EXAM BY PATHOLOGIST: CPT | Mod: TC,ORL | Performed by: DERMATOLOGY

## 2021-08-25 PROCEDURE — 88305 TISSUE EXAM BY PATHOLOGIST: CPT | Mod: 26 | Performed by: DERMATOLOGY

## 2021-08-27 ENCOUNTER — LAB REQUISITION (OUTPATIENT)
Dept: LAB | Facility: CLINIC | Age: 66
End: 2021-08-27
Payer: COMMERCIAL

## 2021-08-27 DIAGNOSIS — D48.5 NEOPLASM OF UNCERTAIN BEHAVIOR OF SKIN: ICD-10-CM

## 2021-08-30 LAB
PATH REPORT.COMMENTS IMP SPEC: NORMAL
PATH REPORT.COMMENTS IMP SPEC: NORMAL
PATH REPORT.FINAL DX SPEC: NORMAL
PATH REPORT.GROSS SPEC: NORMAL
PATH REPORT.MICROSCOPIC SPEC OTHER STN: NORMAL
PATH REPORT.RELEVANT HX SPEC: NORMAL

## 2024-06-24 ENCOUNTER — LAB REQUISITION (OUTPATIENT)
Dept: LAB | Facility: CLINIC | Age: 69
End: 2024-06-24
Payer: COMMERCIAL

## 2024-06-24 DIAGNOSIS — D48.5 NEOPLASM OF UNCERTAIN BEHAVIOR OF SKIN: ICD-10-CM

## 2024-06-24 PROCEDURE — 88305 TISSUE EXAM BY PATHOLOGIST: CPT | Mod: 26 | Performed by: DERMATOLOGY

## 2024-06-24 PROCEDURE — 88305 TISSUE EXAM BY PATHOLOGIST: CPT | Mod: TC,ORL | Performed by: DERMATOLOGY
